# Patient Record
Sex: FEMALE | Race: WHITE | NOT HISPANIC OR LATINO | Employment: UNEMPLOYED | URBAN - METROPOLITAN AREA
[De-identification: names, ages, dates, MRNs, and addresses within clinical notes are randomized per-mention and may not be internally consistent; named-entity substitution may affect disease eponyms.]

---

## 2017-05-15 ENCOUNTER — HOSPITAL ENCOUNTER (EMERGENCY)
Facility: CLINIC | Age: 32
Discharge: HOME | End: 2017-05-15
Attending: PHYSICIAN ASSISTANT | Admitting: PHYSICIAN ASSISTANT
Payer: COMMERCIAL

## 2017-05-15 VITALS
DIASTOLIC BLOOD PRESSURE: 78 MMHG | HEART RATE: 70 BPM | BODY MASS INDEX: 27.6 KG/M2 | HEIGHT: 62 IN | SYSTOLIC BLOOD PRESSURE: 131 MMHG | TEMPERATURE: 98.2 F | RESPIRATION RATE: 18 BRPM | WEIGHT: 150 LBS | OXYGEN SATURATION: 97 %

## 2017-05-15 DIAGNOSIS — S91.312A FOOT LACERATION, LEFT, INITIAL ENCOUNTER: ICD-10-CM

## 2017-05-15 PROCEDURE — 99213 OFFICE O/P EST LOW 20 MIN: CPT | Mod: 25 | Performed by: PHYSICIAN ASSISTANT

## 2017-05-15 PROCEDURE — 99213 OFFICE O/P EST LOW 20 MIN: CPT

## 2017-05-15 PROCEDURE — 12001 RPR S/N/AX/GEN/TRNK 2.5CM/<: CPT | Performed by: PHYSICIAN ASSISTANT

## 2017-05-15 PROCEDURE — 12001 RPR S/N/AX/GEN/TRNK 2.5CM/<: CPT

## 2017-05-15 NOTE — ED AVS SNAPSHOT
[facility] Emergency Department    [address] [address]    Phone:  [phone]    Fax:  [phone]                                       Melina Caicedo   MRN: [id number]    Department:  [facility] Emergency Department   Date of Visit:  5/15/2017           After Visit Summary Signature Page     I have received my discharge instructions, and my questions have been answered. I have discussed any challenges I see with this plan with the nurse or doctor.    ..........................................................................................................................................  Patient/Patient Representative Signature      ..........................................................................................................................................  Patient Representative Print Name and Relationship to Patient    ..................................................               ................................................  Date                                            Time    ..........................................................................................................................................  Reviewed by Signature/Title    ...................................................              ..............................................  Date                                                            Time

## 2017-05-15 NOTE — LETTER
[facility] EMERGENCY DEPARTMENT  [address] [address]  [phone]  Dept: [phone]      5/15/2017    Re: Melina BARRAZA Marifer      TO WHOM IT MAY CONCERN:    Melina Caicedo  was seen on 5/15/17.  Please excuse her from work until 5/17/17 due to injury.    Cordially    Liberty Marlow PA-C   [facility] EMERGENCY DEPARTMENT

## 2017-05-15 NOTE — ED AVS SNAPSHOT
[facility] Emergency Department    [address] [address]    Phone:  [phone]    Fax:  [phone]                                       Melina Caicedo   MRN: [id number]    Department:  [facility] Emergency Department   Date of Visit:  5/15/2017           Patient Information     Date Of Birth          1985        Your diagnoses for this visit were:     Foot laceration, left, initial encounter        You were seen by Liberty Marlow PA-C.      Follow-up Information     Follow up In 12 days.    Why:  for suture removal        Discharge Instructions       After care instructions:  Keep wound clean and dry for the next 24-48 hours  Sutures out in 10-12 days  Signs of infection discussed today  May return to work as long as wound is kept clean and dry  Discussed the probability of scarring  Active range of motion exercises encouraged    Tetanus up to date (2013)    Return to clinic sooner or go to Emergency Room if symptoms worsen or change or signs of infection occur (redness, red streaking, warmth, increased pain, increased swelling, purulent drainage, or fevers occur.)    May use acetaminophen, ibuprofen prn.    Patient voiced understanding of instructions given.            24 Hour Appointment Hotline       To make an appointment at any [facility], call [phone] ([phone]). If you don't have a family doctor or clinic, we will help you find one. [facility] clinics are conveniently located to serve the needs of you and your family.             Review of your medicines      Our records show that you are taking the medicines listed below. If these are incorrect, please call your family doctor or clinic.        Dose / Directions Last dose taken    fluticasone-salmeterol 250-50 MCG/DOSE diskus inhaler   Commonly known as:  ADVAIR   Dose:  1 puff        Inhale 1 puff into the lungs every 12 hours.   Refills:  0        hydrOXYzine 25 MG tablet   Commonly known  as:  ATARAX   Dose:  25-50 mg   Quantity:  20 tablet        Take 1-2 tablets (25-50 mg) by mouth every 6 hours as needed for itching   Refills:  0        ibuprofen 800 MG tablet   Commonly known as:  ADVIL/MOTRIN        TAKE 1 TABLET BY MOUTH THREE TIMES DAILY AS NEEDED FOR PAIN FOR UP TO 10 DAYS   Refills:  0        NICOTINE MINI 2 MG lozenge   Generic drug:  nicotine polacrilex        PLACE ONE LOZENGE BETWEEN CHEEK AND GUM EVERY 1 HOUR AS NEEDED FOR SMOKING CESSATION AS DIRECTED PER PACKAGE   Refills:  0        prenatal multivitamin  with iron 28-0.8 MG Tabs   Dose:  1 tablet        Take 1 tablet by mouth daily.   Refills:  0        senna-docusate 8.6-50 MG per tablet   Commonly known as:  SENOKOT-S;PERICOLACE        Refills:  0        ZYRTEC ALLERGY 10 MG tablet   Dose:  10 mg   Generic drug:  cetirizine        Take 10 mg by mouth At Bedtime.   Refills:  0                Orders Needing Specimen Collection     None      Pending Results     No orders found from 5/13/2017 to 5/16/2017.            Pending Culture Results     No orders found from 5/13/2017 to 5/16/2017.            Pending Results Instructions     If you had any lab results that were not finalized at the time of your Discharge, you can call the ED Lab Result RN at [phone]. You will be contacted by this team for any positive Lab results or changes in treatment. The nurses are available 7 days a week from 10A to 6:30P.  You can leave a message 24 hours per day and they will return your call.        Test Results From Your Hospital Stay               Thank you for choosing [facility]       Thank you for choosing [facility] for your care. Our goal is always to provide you with excellent care. Hearing back from our patients is one way we can continue to improve our services. Please take a few minutes to complete the written survey that you may receive in the mail after you visit with us. Thank you!        [employer] Information     [employer] lets you send  "messages to your doctor, view your test results, renew your prescriptions, schedule appointments and more. To sign up, go to [email or web address] . Click on \"Log in\" on the left side of the screen, which will take you to the Welcome page. Then click on \"Sign up Now\" on the right side of the page.     You will be asked to enter the access code listed below, as well as some personal information. Please follow the directions to create your username and password.     Your access code is: [email or web address]  Expires: 2017  7:07 PM     Your access code will  in 90 days. If you need help or a new code, please call your [facility] clinic or [phone].        Care EveryWhere ID     This is your Care EveryWhere ID. This could be used by other organizations to access your [facility] medical records  [id number]        After Visit Summary       This is your record. Keep this with you and show to your community pharmacist(s) and doctor(s) at your next visit.                  "

## 2017-05-16 ENCOUNTER — APPOINTMENT (OUTPATIENT)
Dept: GENERAL RADIOLOGY | Facility: CLINIC | Age: 32
End: 2017-05-16
Attending: PHYSICIAN ASSISTANT
Payer: COMMERCIAL

## 2017-05-16 ENCOUNTER — HOSPITAL ENCOUNTER (EMERGENCY)
Facility: CLINIC | Age: 32
Discharge: HOME | End: 2017-05-16
Attending: PHYSICIAN ASSISTANT | Admitting: PHYSICIAN ASSISTANT
Payer: COMMERCIAL

## 2017-05-16 VITALS
DIASTOLIC BLOOD PRESSURE: 78 MMHG | RESPIRATION RATE: 16 BRPM | TEMPERATURE: 98.1 F | OXYGEN SATURATION: 100 % | SYSTOLIC BLOOD PRESSURE: 127 MMHG

## 2017-05-16 DIAGNOSIS — M79.672 LEFT FOOT PAIN: ICD-10-CM

## 2017-05-16 DIAGNOSIS — S91.312D FOOT LACERATION, LEFT, SUBSEQUENT ENCOUNTER: ICD-10-CM

## 2017-05-16 PROCEDURE — 73630 X-RAY EXAM OF FOOT: CPT | Mod: LT

## 2017-05-16 PROCEDURE — 99283 EMERGENCY DEPT VISIT LOW MDM: CPT

## 2017-05-16 PROCEDURE — 99283 EMERGENCY DEPT VISIT LOW MDM: CPT | Performed by: PHYSICIAN ASSISTANT

## 2017-05-16 ASSESSMENT — ENCOUNTER SYMPTOMS
CONSTITUTIONAL NEGATIVE: 1
WOUND: 1
NEUROLOGICAL NEGATIVE: 1

## 2017-05-16 NOTE — ED AVS SNAPSHOT
[facility] Emergency Department    [address] [address]    Phone:  [phone]    Fax:  [phone]                                       Melina Caicedo   MRN: [id number]    Department:  [facility] Emergency Department   Date of Visit:  5/16/2017           Patient Information     Date Of Birth          1985        Your diagnoses for this visit were:     Left foot pain     Foot laceration, left, subsequent encounter        You were seen by Shellie Bass PA-C.      Follow-up Information     Follow up with [facility] in 3 days.    Specialty:  Family Practice    Why:  As needed, For persistent symptoms    Contact information:    [facility] PHY  [address] [address]  [phone]          Follow up with [facility] Emergency Department.    Specialty:  EMERGENCY MEDICINE    Why:  As needed, If symptoms worsen    Contact information:    [address] [address] [phone]    Additional information:    The medical center is located at   [address]. (between [address] and   [address] in [place], [place] miles north   of [place]).      24 Hour Appointment Hotline       To make an appointment at any [place] clinic, call [phone] ([phone]). If you don't have a family doctor or clinic, we will help you find one. [place] clinics are conveniently located to serve the needs of you and your family.          ED Discharge Orders     Post-Op Shoe                    Review of your medicines      Our records show that you are taking the medicines listed below. If these are incorrect, please call your family doctor or clinic.        Dose / Directions Last dose taken    fluticasone-salmeterol 250-50 MCG/DOSE diskus inhaler   Commonly known as:  ADVAIR   Dose:  1 puff        Inhale 1 puff into the lungs every 12 hours.   Refills:  0        hydrOXYzine 25 MG tablet   Commonly known as:  ATARAX    Dose:  25-50 mg   Quantity:  20 tablet        Take 1-2 tablets (25-50 mg) by mouth every 6 hours as needed for itching   Refills:  0        ibuprofen 800 MG tablet   Commonly known as:  ADVIL/MOTRIN        TAKE 1 TABLET BY MOUTH THREE TIMES DAILY AS NEEDED FOR PAIN FOR UP TO 10 DAYS   Refills:  0        NICOTINE MINI 2 MG lozenge   Generic drug:  nicotine polacrilex        PLACE ONE LOZENGE BETWEEN CHEEK AND GUM EVERY 1 HOUR AS NEEDED FOR SMOKING CESSATION AS DIRECTED PER PACKAGE   Refills:  0        prenatal multivitamin  with iron 28-0.8 MG Tabs   Dose:  1 tablet        Take 1 tablet by mouth daily.   Refills:  0        senna-docusate 8.6-50 MG per tablet   Commonly known as:  SENOKOT-S;PERICOLACE        Refills:  0        ZYRTEC ALLERGY 10 MG tablet   Dose:  10 mg   Generic drug:  cetirizine        Take 10 mg by mouth At Bedtime.   Refills:  0                Procedures and tests performed during your visit     Foot XR, G/E 3 views, left      Orders Needing Specimen Collection     None      Pending Results     No orders found from 5/14/2017 to 5/17/2017.            Pending Culture Results     No orders found from 5/14/2017 to 5/17/2017.            Pending Results Instructions     If you had any lab results that were not finalized at the time of your Discharge, you can call the ED Lab Result RN at [phone]. You will be contacted by this team for any positive Lab results or changes in treatment. The nurses are available 7 days a week from 10A to 6:30P.  You can leave a message 24 hours per day and they will return your call.        Test Results From Your Hospital Stay        5/16/2017 11:33 AM      Narrative     XR FOOT LT G/E 3 VW 5/16/2017 11:01 AM    HISTORY: Cut by glass yesterday near top of first metatarsal.  Increasing pain.    COMPARISON: None.        Impression     IMPRESSION: 3 views of the left foot show no osseous abnormality. No  radiopaque foreign body is evident.     PEEWEE LOPEZ MD               "  Thank you for choosing [facility]       Thank you for choosing [facility] for your care. Our goal is always to provide you with excellent care. Hearing back from our patients is one way we can continue to improve our services. Please take a few minutes to complete the written survey that you may receive in the mail after you visit with us. Thank you!        [employer] Information     [employer] lets you send messages to your doctor, view your test results, renew your prescriptions, schedule appointments and more. To sign up, go to [email or web address] . Click on \"Log in\" on the left side of the screen, which will take you to the Welcome page. Then click on \"Sign up Now\" on the right side of the page.     You will be asked to enter the access code listed below, as well as some personal information. Please follow the directions to create your username and password.     Your access code is: [email or web address]  Expires: 2017  7:07 PM     Your access code will  in 90 days. If you need help or a new code, please call your [facility] clinic or [phone].        Care EveryWhere ID     This is your Care EveryWhere ID. This could be used by other organizations to access your [facility] medical records  [id number]        After Visit Summary       This is your record. Keep this with you and show to your community pharmacist(s) and doctor(s) at your next visit.                  "

## 2017-05-16 NOTE — ED PROVIDER NOTES
"  History     Chief Complaint   Patient presents with     Laceration     L foot.  pt dropped a beer bottle on L foot.  UTD tetanus     HPI    Melina Caicedo is a 31 year old female who presents to the clinic with a laceration on the foot sustained 1 hours ago.  This is a non-work related injury.  Mechanism of injury: patient accidentally dropped a glass bottle and when it hit the deck it shattered and a piece of glass cut the top of her foot.     Associated symptoms: Denies numbness, weakness, or loss of function  Last tetanus booster within 5 years: yes    Problem list, Medication list, Allergies, and Medical/Social/Surgical histories reviewed in [facility] and updated as appropriate.    Review of Systems     All normal unless stated above     Physical Exam   BP: 131/78  Pulse: 70  Temp: 98.2  F (36.8  C)  Resp: 18  Height: 157.5 cm (5' 2\")  Weight: 68 kg (150 lb)  SpO2: 97 %  Physical Exam     Blood pressure 131/78, pulse 70, temperature 98.2  F (36.8  C), temperature source Oral, resp. rate 18, height 1.575 m (5' 2\"), weight 68 kg (150 lb), SpO2 97 %, not currently breastfeeding.  The patient appears today in alert and in no apparent distress distress  Size of laceration: 1 centimeters  Characteristics of the laceration: bleeding- mild, clean, straight and superficial  Tendon function intact: yes  Sensation to light touch intact: yes  Pulses intact: yes    No results found for this or any previous visit (from the past 24 hour(s)).        ED Course     ED Course     Laceration repair  Date/Time: 5/15/2017 7:11 PM  Performed by: RACHNA EASON  Authorized by: RACHNA EASON   Consent: Verbal consent obtained.  Risks and benefits: risks, benefits and alternatives were discussed  Consent given by: patient  Patient identity confirmed: verbally with patient  Body area: lower extremity  Location details: left foot  Laceration length: 1 cm  Foreign bodies: no foreign bodies  Tendon involvement: none  Nerve " involvement: none  Vascular damage: no  Anesthesia: local infiltration    Anesthesia:  Anesthesia: local infiltration  Local Anesthetic: lidocaine 1% without epinephrine   Preparation: Patient was prepped and draped in the usual sterile fashion.  Irrigation solution: saline  Irrigation method: syringe  Amount of cleaning: standard  Debridement: none  Degree of undermining: none  Skin closure: 4-0 nylon  Number of sutures: 3  Technique: simple  Approximation: close  Approximation difficulty: simple  Dressing: antibiotic ointment (bandage)  Patient tolerance: Patient tolerated the procedure well with no immediate complications                  Critical Care time:  none               Labs Ordered and Resulted from Time of ED Arrival Up to the Time of Departure from the ED - No data to display    Assessments & Plan (with Medical Decision Making)     I have reviewed the nursing notes.    I have reviewed the findings, diagnosis, plan and need for follow up with the patient.    Discharge Medication List as of 5/15/2017  7:07 PM          Final diagnoses:   Foot laceration, left, initial encounter       5/15/2017   [facility] EMERGENCY DEPARTMENT     Liberty Marlow PA-C  05/15/17 1913       Liberty Marlow PA-C  05/15/17 1917

## 2017-05-16 NOTE — ED NOTES
Pt dropped a heavy jar on L foot last evening.  Here for stitches.  Wondering if she broke something in her foot.

## 2017-05-16 NOTE — ED NOTES
Patient had dropped heavy jar to right of left foot yesterday with bottom striking floor and then top of left foot.  Came to urgent care for stitches.  Today difficult to put pressure on left foot.  Swelling and bruising noted to top of foot  CMS good.  Sutures intact.  No redness noted.

## 2017-05-16 NOTE — ED AVS SNAPSHOT
[facility] Emergency Department    [address] [address]    Phone:  [phone]    Fax:  [phone]                                       Melina Caicedo   MRN: [id number]    Department:  [facility] Emergency Department   Date of Visit:  5/16/2017           After Visit Summary Signature Page     I have received my discharge instructions, and my questions have been answered. I have discussed any challenges I see with this plan with the nurse or doctor.    ..........................................................................................................................................  Patient/Patient Representative Signature      ..........................................................................................................................................  Patient Representative Print Name and Relationship to Patient    ..................................................               ................................................  Date                                            Time    ..........................................................................................................................................  Reviewed by Signature/Title    ...................................................              ..............................................  Date                                                            Time

## 2017-05-16 NOTE — LETTER
[facility] EMERGENCY DEPARTMENT  [address] [address]  [phone]    May 16, 2017    Melina Caicedo  [address] [address]  [phone] (home)     : 1985      To Whom it may concern:    Melina Caicedo was seen in our Emergency Department today, May 16, 2017.  Please excuse from work on 17 and 17.  Thank you.      Sincerely,        Shellie Bass

## 2017-05-16 NOTE — ED PROVIDER NOTES
History     Chief Complaint   Patient presents with     Foot Injury     Pt dropped a heavy jar on L foot last evening.  Here for stitches.  Wondering if she broke something in her foot.     HPI  Melina Caicedo is a 31 year old female with history of asthma, bipolar affective disorder, anxiety who presents with complaints of left foot pain since yesterday.  Patient was evaluated in the urgent care yesterday after she was accidentally cut by a beer bottle.  She states she dropped a glass bottle and it shattered when it hit the ground.  She states a piece of glass reportedly bounced back up off the ground and cut the top of her foot.  Three sutures were placed when she was evaluated in urgent care yesterday.  No x-rays were obtained at that time.  Patient's laceration was reportedly superficial, and she did not have evidence of tendon laceration or injury on yesterday's exam.  She states she has had worsening foot pain since last night.  Describes pain overlying the dorsum of her foot surrounding the laceration site.  States she is unable to wear her tennis shoe as it rubs over this area causing more pain.  She has been unable to put much weight on her foot this morning.  Denies any associated swelling or erythema of the area.  No reported fevers or chills.  She denies any new injury to the area.  She states she took 800 mg of Ibuprofen this morning with minimal improvement.  Her Tetanus is up-to-date (2014).    I have reviewed the Medications, Allergies, Past Medical and Surgical History, and Social History in the Epic system.    Review of Systems   Constitutional: Negative.    Musculoskeletal:        Left foot pain   Skin: Positive for wound (sutures intact to foot laceration).   Neurological: Negative.    All other systems reviewed and are negative.      Physical Exam   BP: 127/78  Heart Rate: 68  Temp: 98.1  F (36.7  C)  Resp: 16  SpO2: 100 %  Physical Exam   Constitutional: She appears well-developed and  well-nourished. No distress.   HENT:   Head: Normocephalic and atraumatic.   Cardiovascular: Intact distal pulses.    Musculoskeletal:        Left ankle: Normal.        Left foot: There is decreased range of motion and tenderness. There is no swelling, normal capillary refill, no crepitus, no deformity and no laceration.        Feet:    There are 3 sutures intact to laceration to dorsum of left foot overlying the 1st distal metatarsal.  There is no surrounding erythema, warmth, or swelling.  Patient has point tenderness to this area.  She notes increased pain with range of motion of her great toe.  No streaking erythema.   Neurological: She is alert. She has normal strength. No sensory deficit.   Skin: Skin is warm and dry.       ED Course     ED Course     Procedures    Results for orders placed or performed during the hospital encounter of 05/16/17   Foot XR, G/E 3 views, left    Narrative    XR FOOT LT G/E 3 VW 5/16/2017 11:01 AM    HISTORY: Cut by glass yesterday near top of first metatarsal.  Increasing pain.    COMPARISON: None.      Impression    IMPRESSION: 3 views of the left foot show no osseous abnormality. No  radiopaque foreign body is evident.     PEEWEE LOPEZ MD       Assessments & Plan (with Medical Decision Making)     Pt is a 31 year old female with history of asthma, bipolar affective disorder, anxiety who presents with complaints of left foot pain since yesterday.  Patient was evaluated in the urgent care yesterday after she was accidentally cut by a beer bottle.  She states she dropped a glass bottle and it shattered when it hit the ground.  She states a piece of glass reportedly bounced back up off the ground and cut the top of her foot.  Three sutures were placed when she was evaluated in urgent care yesterday.  No x-rays were obtained at that time.  Patient's laceration was reportedly superficial, and she did not have evidence of tendon laceration or injury on yesterday's exam.  She states  she has had worsening foot pain since last night.  Describes pain overlying the dorsum of her foot surrounding the laceration site.  States she is unable to wear her tennis shoe as it rubs over this area causing more pain.  She has been unable to put much weight on her foot this morning.  Denies any associated swelling or erythema of the area.  No reported fevers or chills.  She denies any new injury to the area.  She states she took 800 mg of Ibuprofen this morning with minimal improvement.  Her Tetanus is up-to-date (2014).  Pt is afebrile on arrival.  Exam as above.  No evidence of infection noted on exam.  X-rays of left foot were negative for acute pathology or foreign body.  We discussed signs and symptoms of infection to be aware of and return for.  Pt was placed in a post-op shoe to assist with her symptoms.  Hand-outs provided.    Patient was instructed to follow-up with PCP if no improvement in 3-5 days for continued care and management or sooner if new or worsening symptoms.  She is to return to the ED for persistent and/or worsening symptoms.  Patient expressed understanding of the diagnosis and plan and was discharged home in good condition.     have reviewed the nursing notes.    I have reviewed the findings, diagnosis, plan and need for follow up with the patient.    Discharge Medication List as of 5/16/2017 11:42 AM          Final diagnoses:   Left foot pain   Foot laceration, left, subsequent encounter       5/16/2017   [facility] EMERGENCY DEPARTMENT     Shellie Bass PA-C  05/16/17 9790

## 2017-05-16 NOTE — DISCHARGE INSTRUCTIONS
After care instructions:  Keep wound clean and dry for the next 24-48 hours  Sutures out in 10-12 days  Signs of infection discussed today  May return to work as long as wound is kept clean and dry  Discussed the probability of scarring  Active range of motion exercises encouraged    Tetanus up to date (2013)    Return to clinic sooner or go to Emergency Room if symptoms worsen or change or signs of infection occur (redness, red streaking, warmth, increased pain, increased swelling, purulent drainage, or fevers occur.)    May use acetaminophen, ibuprofen prn.    Patient voiced understanding of instructions given.

## 2017-11-02 ENCOUNTER — HOSPITAL ENCOUNTER (EMERGENCY)
Facility: CLINIC | Age: 32
Discharge: HOME | End: 2017-11-02
Attending: EMERGENCY MEDICINE | Admitting: EMERGENCY MEDICINE
Payer: COMMERCIAL

## 2017-11-02 VITALS
TEMPERATURE: 98.1 F | WEIGHT: 130 LBS | DIASTOLIC BLOOD PRESSURE: 86 MMHG | RESPIRATION RATE: 16 BRPM | SYSTOLIC BLOOD PRESSURE: 128 MMHG | HEART RATE: 75 BPM | BODY MASS INDEX: 23.78 KG/M2 | OXYGEN SATURATION: 98 %

## 2017-11-02 DIAGNOSIS — J06.9 VIRAL URI: ICD-10-CM

## 2017-11-02 LAB
DEPRECATED S PYO AG THROAT QL EIA: NORMAL
SPECIMEN SOURCE: NORMAL

## 2017-11-02 PROCEDURE — 87880 STREP A ASSAY W/OPTIC: CPT | Performed by: EMERGENCY MEDICINE

## 2017-11-02 PROCEDURE — 25000132 ZZH RX MED GY IP 250 OP 250 PS 637: Performed by: EMERGENCY MEDICINE

## 2017-11-02 PROCEDURE — 87081 CULTURE SCREEN ONLY: CPT | Performed by: EMERGENCY MEDICINE

## 2017-11-02 PROCEDURE — 99283 EMERGENCY DEPT VISIT LOW MDM: CPT | Mod: Z6 | Performed by: EMERGENCY MEDICINE

## 2017-11-02 PROCEDURE — 99283 EMERGENCY DEPT VISIT LOW MDM: CPT | Performed by: EMERGENCY MEDICINE

## 2017-11-02 RX ORDER — MULTIVITAMIN WITH IRON
500 TABLET ORAL AT BEDTIME
COMMUNITY

## 2017-11-02 RX ORDER — VENLAFAXINE HYDROCHLORIDE 37.5 MG/1
37.5 CAPSULE, EXTENDED RELEASE ORAL DAILY
COMMUNITY

## 2017-11-02 RX ORDER — IBUPROFEN 400 MG/1
800 TABLET, FILM COATED ORAL ONCE
Status: COMPLETED | OUTPATIENT
Start: 2017-11-02 | End: 2017-11-02

## 2017-11-02 RX ADMIN — IBUPROFEN 800 MG: 400 TABLET ORAL at 20:32

## 2017-11-02 NOTE — ED AVS SNAPSHOT
[facility] Emergency Department    [address] [address]    Phone:  [phone]    Fax:  [phone]                                       Melina Caicedo   MRN: [id number]    Department:  [facility] Emergency Department   Date of Visit:  11/2/2017           Patient Information     Date Of Birth          1985        Your diagnoses for this visit were:     Viral URI        You were seen by Jonh Inman MD.        Discharge Instructions       Return to the emergency department if you have worsening symptoms, repeated vomiting, no urine output over 8 hours, difficulty breathing, or other concerns.  Otherwise follow-up with clinic if not better in 2-3 days.    24 Hour Appointment Hotline       To make an appointment at any [place] clinic, call [phone] ([phone]). If you don't have a family doctor or clinic, we will help you find one. [place] clinics are conveniently located to serve the needs of you and your family.             Review of your medicines      Our records show that you are taking the medicines listed below. If these are incorrect, please call your family doctor or clinic.        Dose / Directions Last dose taken    fluticasone-salmeterol 250-50 MCG/DOSE diskus inhaler   Commonly known as:  ADVAIR   Dose:  1 puff        Inhale 1 puff into the lungs every 12 hours.   Refills:  0        ibuprofen 800 MG tablet   Commonly known as:  ADVIL/MOTRIN        TAKE 1 TABLET BY MOUTH THREE TIMES DAILY AS NEEDED FOR PAIN FOR UP TO 10 DAYS   Refills:  0        isometheptene-dichloralphenazone-acetaminophen -325 MG per capsule   Commonly known as:  MIDRIN   Dose:  1 capsule        Take 1 capsule by mouth every 4 hours as needed for headaches   Refills:  0        magnesium 250 MG tablet   Dose:  1 tablet        Take 1 tablet by mouth daily   Refills:  0        prenatal multivitamin  with iron 28-0.8 MG Tabs   Dose:  1 tablet        Take 1 tablet by  mouth daily.   Refills:  0        senna-docusate 8.6-50 MG per tablet   Commonly known as:  SENOKOT-S;PERICOLACE   Dose:  2 tablet        Take 2 tablets by mouth daily   Refills:  0        venlafaxine 37.5 MG 24 hr capsule   Commonly known as:  EFFEXOR-XR   Dose:  37.5 mg        Take 37.5 mg by mouth daily   Refills:  0        ZYRTEC ALLERGY 10 MG tablet   Dose:  10 mg   Generic drug:  cetirizine        Take 10 mg by mouth At Bedtime.   Refills:  0                Procedures and tests performed during your visit     Rapid Strep Screen      Orders Needing Specimen Collection     None      Pending Results     No orders found from 10/31/2017 to 11/3/2017.            Pending Culture Results     No orders found from 10/31/2017 to 11/3/2017.            Pending Results Instructions     If you had any lab results that were not finalized at the time of your Discharge, you can call the ED Lab Result RN at [phone]. You will be contacted by this team for any positive Lab results or changes in treatment. The nurses are available 7 days a week from 10A to 6:30P.  You can leave a message 24 hours per day and they will return your call.        Test Results From Your Hospital Stay        11/2/2017  8:58 PM      Component Results     Component    Specimen Description    Throat    Rapid Strep A Screen    NEGATIVE: No Group A streptococcal antigen detected by immunoassay, await culture report.                Thank you for choosing [facility]       Thank you for choosing [facility] for your care. Our goal is always to provide you with excellent care. Hearing back from our patients is one way we can continue to improve our services. Please take a few minutes to complete the written survey that you may receive in the mail after you visit with us. Thank you!        [employer] Information     [employer] lets you send messages to your doctor, view your test results, renew your prescriptions, schedule appointments and more. To sign up, go to  [email or web address] . Click on \"Log in\" on the left side of the screen, which will take you to the Welcome page. Then click on \"Sign up Now\" on the right side of the page.     You will be asked to enter the access code listed below, as well as some personal information. Please follow the directions to create your username and password.     Your access code is: [email or web address]  Expires: 2018  9:00 PM     Your access code will  in 90 days. If you need help or a new code, please call your [place] clinic or [phone].        Care EveryWhere ID     This is your Care EveryWhere ID. This could be used by other organizations to access your [place] medical records  [id number]        Equal Access to Services     ZAINAB BURNETTE : Vance Patel, rolando hutchins, yaya riley, marichuy varela. So [facility] [phone].    ATENCIÓN: Si habla español, tiene a lawrence disposición servicios gratuitos de asistencia lingüística. Davis al [phone].    We comply with applicable federal civil rights laws and [place] laws. We do not discriminate on the basis of race, color, national origin, age, disability, sex, sexual orientation, or gender identity.            After Visit Summary       This is your record. Keep this with you and show to your community pharmacist(s) and doctor(s) at your next visit.                  "

## 2017-11-02 NOTE — ED AVS SNAPSHOT
[facility] Emergency Department    [address] [address]    Phone:  [phone]    Fax:  [phone]                                       Melina Caicedo   MRN: [id number]    Department:  [facility] Emergency Department   Date of Visit:  11/2/2017           After Visit Summary Signature Page     I have received my discharge instructions, and my questions have been answered. I have discussed any challenges I see with this plan with the nurse or doctor.    ..........................................................................................................................................  Patient/Patient Representative Signature      ..........................................................................................................................................  Patient Representative Print Name and Relationship to Patient    ..................................................               ................................................  Date                                            Time    ..........................................................................................................................................  Reviewed by Signature/Title    ...................................................              ..............................................  Date                                                            Time

## 2017-11-03 NOTE — DISCHARGE INSTRUCTIONS
Return to the emergency department if you have worsening symptoms, repeated vomiting, no urine output over 8 hours, difficulty breathing, or other concerns.  Otherwise follow-up with clinic if not better in 2-3 days.

## 2017-11-03 NOTE — ED NOTES
Pt presents with c/o right ear pain and vertigo for the past 3 days. Has hx tonsil stones, feels this may be the reason for ear pain. Pt also dx with meningioma in July and feels her vertigo may be just due to that also. No fevers, nasal congestion. C/o some nausea.

## 2017-11-03 NOTE — ED PROVIDER NOTES
History     Chief Complaint   Patient presents with     Otalgia     swollen glands and vertigo     HPI  Melina Caicedo is a 32 year old female who presents for right ear pain, sore throat, hoarse voice, and mild frontal headache.  Symptoms have been ongoing for the past several days including worse.  Eating and drinking normally.  She has had 2 episodes of nonbloody and nonbilious emesis today but denies any nausea currently.  No diarrhea.  She denies chest pain, cough, abdominal pain, dysuria, or rash.  She does have a history of a meningioma that is being followed and reports that she has been having intermittent vertigo over the past several months, slightly worse today.  She describes episodes last for 30 seconds to a minute and then resolved and happened about 6 times today. No vertigo currently.  She denies double vision, recent falls, word finding difficulty.    Problem List:    Patient Active Problem List    Diagnosis Date Noted     Major depressive disorder 10/21/2016     Priority: Medium     Overview:   Major depression especially post partum.       Hematuria 10/10/2013     Priority: Medium     Heart murmur 09/12/2013     Priority: Medium     Overview:   Auscultated on 9/12/13.  No history of murmur according to patient and chart.       Persistent asthma 11/09/2012     Priority: Medium     Anxiety 02/01/2007     Priority: Medium        Past Medical History:    Past Medical History:   Diagnosis Date     Anxiety      Asthma      Bipolar affective (H)      Depressive disorder        Past Surgical History:    Past Surgical History:   Procedure Laterality Date     CHOLECYSTECTOMY  6/2011     ENDOSCOPY UPPER WITH PANCREATIC STIMULATION         Family History:    No family history on file.    Social History:  Marital Status:  Single [1]  Social History   Substance Use Topics     Smoking status: Never Smoker     Smokeless tobacco: Never Used     Alcohol use No        Medications:       isometheptene-dichloralphenazone-acetaminophen (MIDRIN) -325 MG per capsule   venlafaxine (EFFEXOR-XR) 37.5 MG 24 hr capsule   magnesium 250 MG tablet   ibuprofen (ADVIL,MOTRIN) 800 MG tablet   senna-docusate (SENOKOT-S;PERICOLACE) 8.6-50 MG per tablet   cetirizine (ZYRTEC ALLERGY) 10 MG tablet   Prenatal Vit-Fe Fumarate-FA (PRENATAL MULTIVITAMIN  WITH IRON) 28-0.8 MG TABS   fluticasone-salmeterol (ADVAIR) 250-50 MCG/DOSE diskus inhaler         Review of Systems  A 4 point review of systems was performed. All pertinent positives and negatives were listed in the HPI and rest of ROS were otherwise negative.    Physical Exam   BP: 128/86  Pulse: 75  Temp: 98.1  F (36.7  C)  Resp: 16  Weight: 59 kg (130 lb)  SpO2: 98 %      Physical Exam   Constitutional: She is oriented to person, place, and time. She appears well-developed and well-nourished. She appears distressed.   HENT:   Head: Normocephalic and atraumatic.   Right Ear: Tympanic membrane and external ear normal. No drainage, swelling or tenderness.   Left Ear: Tympanic membrane and external ear normal.   Nose: Nose normal.   Mouth/Throat: No trismus in the jaw. No oropharyngeal exudate, posterior oropharyngeal edema, posterior oropharyngeal erythema or tonsillar abscesses.   Eyes: Conjunctivae are normal. No scleral icterus.   Neck: Normal range of motion.   Cardiovascular: Normal rate and regular rhythm.    Pulmonary/Chest: Effort normal. No stridor. No respiratory distress.   Abdominal: Soft. She exhibits no distension. There is no tenderness. There is no rigidity, no rebound and no guarding.   Neurological: She is alert and oriented to person, place, and time. No cranial nerve deficit or sensory deficit. She exhibits normal muscle tone. Coordination and gait normal. GCS eye subscore is 4. GCS verbal subscore is 5. GCS motor subscore is 6.   No dysmetria.  No dysdiadochokinesis.  Normal tandem gait.  No visual field deficit.   Skin: Skin is warm and  dry. She is not diaphoretic.   Psychiatric: She has a normal mood and affect. Her behavior is normal.   Nursing note and vitals reviewed.      ED Course     ED Course     Procedures               Critical Care time:  none               Labs Ordered and Resulted from Time of ED Arrival Up to the Time of Departure from the ED   RAPID STREP SCREEN       Assessments & Plan (with Medical Decision Making)   32-year-old female presents for right ear pain and sore throat, hoarse voice.  Differential includes viral URI, pharyngitis, otitis media.  Blood pressure 120/86, heart 75, temperature 98.1 Fahrenheit, SPO2 98% on room air.  No focal neurologic findings today, no signs of worsening of her meningioma, no indication for repeat imaging at this time.  No signs of otitis media or otitis externa on exam. Ibuprofen for symptoms.  Throat swab negative for group A strep.  Throat culture pending.  She is likely suffering from a viral URI and is discharged with instructions to use acetaminophen and ibuprofen.  Symptoms, drinking any fluids, return if worse, otherwise follow up in clinic in 2-3 days if not improved.  The patient is in agreement with this plan.    I have reviewed the nursing notes.    I have reviewed the findings, diagnosis, plan and need for follow up with the patient.       New Prescriptions    No medications on file       Final diagnoses:   Viral URI       11/2/2017   [facility] EMERGENCY DEPARTMENT     Jonh Inman MD  11/02/17 2819

## 2017-11-05 LAB
BACTERIA SPEC CULT: NORMAL
Lab: NORMAL
SPECIMEN SOURCE: NORMAL

## 2018-02-20 ENCOUNTER — APPOINTMENT (OUTPATIENT)
Dept: ULTRASOUND IMAGING | Facility: CLINIC | Age: 33
End: 2018-02-20
Attending: FAMILY MEDICINE
Payer: COMMERCIAL

## 2018-02-20 ENCOUNTER — HOSPITAL ENCOUNTER (EMERGENCY)
Facility: CLINIC | Age: 33
Discharge: HOME | End: 2018-02-20
Attending: FAMILY MEDICINE | Admitting: FAMILY MEDICINE
Payer: COMMERCIAL

## 2018-02-20 VITALS — TEMPERATURE: 98.6 F | RESPIRATION RATE: 18 BRPM | SYSTOLIC BLOOD PRESSURE: 128 MMHG | DIASTOLIC BLOOD PRESSURE: 75 MMHG

## 2018-02-20 DIAGNOSIS — R10.31 RLQ ABDOMINAL PAIN: ICD-10-CM

## 2018-02-20 LAB
ALBUMIN SERPL-MCNC: 4.2 G/DL (ref 3.4–5)
ALBUMIN UR-MCNC: NEGATIVE MG/DL
ALP SERPL-CCNC: 58 U/L (ref 40–150)
ALT SERPL W P-5'-P-CCNC: 27 U/L (ref 0–50)
ANION GAP SERPL CALCULATED.3IONS-SCNC: 7 MMOL/L (ref 3–14)
APPEARANCE UR: CLEAR
AST SERPL W P-5'-P-CCNC: 30 U/L (ref 0–45)
BASOPHILS # BLD AUTO: 0 10E9/L (ref 0–0.2)
BASOPHILS NFR BLD AUTO: 0.5 %
BILIRUB SERPL-MCNC: 0.8 MG/DL (ref 0.2–1.3)
BILIRUB UR QL STRIP: NEGATIVE
BUN SERPL-MCNC: 12 MG/DL (ref 7–30)
CALCIUM SERPL-MCNC: 8.9 MG/DL (ref 8.5–10.1)
CHLORIDE SERPL-SCNC: 106 MMOL/L (ref 94–109)
CO2 SERPL-SCNC: 27 MMOL/L (ref 20–32)
COLOR UR AUTO: COLORLESS
CREAT SERPL-MCNC: 0.59 MG/DL (ref 0.52–1.04)
DIFFERENTIAL METHOD BLD: NORMAL
EOSINOPHIL # BLD AUTO: 0.1 10E9/L (ref 0–0.7)
EOSINOPHIL NFR BLD AUTO: 1.6 %
ERYTHROCYTE [DISTWIDTH] IN BLOOD BY AUTOMATED COUNT: 12.4 % (ref 10–15)
GFR SERPL CREATININE-BSD FRML MDRD: >90 ML/MIN/1.7M2
GLUCOSE SERPL-MCNC: 77 MG/DL (ref 70–99)
GLUCOSE UR STRIP-MCNC: NEGATIVE MG/DL
HCG SERPL QL: NEGATIVE
HCT VFR BLD AUTO: 37.5 % (ref 35–47)
HGB BLD-MCNC: 13 G/DL (ref 11.7–15.7)
HGB UR QL STRIP: ABNORMAL
IMM GRANULOCYTES # BLD: 0 10E9/L (ref 0–0.4)
IMM GRANULOCYTES NFR BLD: 0.2 %
KETONES UR STRIP-MCNC: 5 MG/DL
LEUKOCYTE ESTERASE UR QL STRIP: NEGATIVE
LIPASE SERPL-CCNC: 107 U/L (ref 73–393)
LYMPHOCYTES # BLD AUTO: 2.3 10E9/L (ref 0.8–5.3)
LYMPHOCYTES NFR BLD AUTO: 38.1 %
MCH RBC QN AUTO: 29.5 PG (ref 26.5–33)
MCHC RBC AUTO-ENTMCNC: 34.7 G/DL (ref 31.5–36.5)
MCV RBC AUTO: 85 FL (ref 78–100)
MONOCYTES # BLD AUTO: 0.4 10E9/L (ref 0–1.3)
MONOCYTES NFR BLD AUTO: 5.9 %
NEUTROPHILS # BLD AUTO: 3.3 10E9/L (ref 1.6–8.3)
NEUTROPHILS NFR BLD AUTO: 53.7 %
NITRATE UR QL: NEGATIVE
PH UR STRIP: 7 PH (ref 5–7)
PLATELET # BLD AUTO: 367 10E9/L (ref 150–450)
POTASSIUM SERPL-SCNC: 3.5 MMOL/L (ref 3.4–5.3)
PROT SERPL-MCNC: 8 G/DL (ref 6.8–8.8)
RBC # BLD AUTO: 4.4 10E12/L (ref 3.8–5.2)
RBC #/AREA URNS AUTO: 0 /HPF (ref 0–2)
SODIUM SERPL-SCNC: 140 MMOL/L (ref 133–144)
SOURCE: ABNORMAL
SP GR UR STRIP: 1 (ref 1–1.03)
SQUAMOUS #/AREA URNS AUTO: <1 /HPF (ref 0–1)
UROBILINOGEN UR STRIP-MCNC: 0 MG/DL (ref 0–2)
WBC # BLD AUTO: 6.1 10E9/L (ref 4–11)
WBC #/AREA URNS AUTO: 0 /HPF (ref 0–2)

## 2018-02-20 PROCEDURE — 85025 COMPLETE CBC W/AUTO DIFF WBC: CPT | Performed by: FAMILY MEDICINE

## 2018-02-20 PROCEDURE — 80053 COMPREHEN METABOLIC PANEL: CPT | Performed by: FAMILY MEDICINE

## 2018-02-20 PROCEDURE — 83690 ASSAY OF LIPASE: CPT | Performed by: FAMILY MEDICINE

## 2018-02-20 PROCEDURE — 99284 EMERGENCY DEPT VISIT MOD MDM: CPT | Mod: 25 | Performed by: FAMILY MEDICINE

## 2018-02-20 PROCEDURE — 84703 CHORIONIC GONADOTROPIN ASSAY: CPT | Performed by: FAMILY MEDICINE

## 2018-02-20 PROCEDURE — 81001 URINALYSIS AUTO W/SCOPE: CPT | Performed by: FAMILY MEDICINE

## 2018-02-20 PROCEDURE — 93976 VASCULAR STUDY: CPT

## 2018-02-20 PROCEDURE — 99284 EMERGENCY DEPT VISIT MOD MDM: CPT | Mod: Z6 | Performed by: FAMILY MEDICINE

## 2018-02-20 RX ORDER — POLYETHYLENE GLYCOL 3350 17 G
2 POWDER IN PACKET (EA) ORAL EVERY 4 HOURS PRN
COMMUNITY
Start: 2017-12-29

## 2018-02-20 RX ORDER — ONDANSETRON 4 MG/1
4-8 TABLET, ORALLY DISINTEGRATING ORAL EVERY 6 HOURS PRN
Qty: 10 TABLET | Refills: 0 | Status: SHIPPED | OUTPATIENT
Start: 2018-02-20 | End: 2019-07-03

## 2018-02-20 NOTE — ED AVS SNAPSHOT
[facility] Emergency Department    [address] [address]    Phone:  [phone]    Fax:  [phone]                                       Melina Caicedo   MRN: [id number]    Department:  [facility] Emergency Department   Date of Visit:  2/20/2018           Patient Information     Date Of Birth          1985        Your diagnoses for this visit were:     RLQ abdominal pain        You were seen by Albino Lynn MD.        Discharge Instructions       Return to the Emergency Room if the following occurs:     Worsened pain, fever >101, vomiting/dehydration, or for any concern at anytime.    Or, follow-up with the following provider as we discussed:     Return to your primary doctor / gynecologist as scheduled.    Medications discussed:    Ibuprofen 600 mg every six hours for pain (7 days duration).  Tylenol 1000 mg every six hours for pain (7 days duration).  Therefore, you can alternate these every three hours and do it safely.  Zofran for nausea, as needed.    If you received pain-relieving or sedating medication during your time in the ER, avoid alcohol, driving automobiles, or working with machinery.  Also, a responsible adult must stay with you.        Call the Nurse Advice Line at [phone] or [phone] for any concern at anytime.      24 Hour Appointment Hotline       To make an appointment at any [place] clinic, call [phone] ([phone]). If you don't have a family doctor or clinic, we will help you find one. [place] clinics are conveniently located to serve the needs of you and your family.             Review of your medicines      START taking        Dose / Directions Last dose taken    ondansetron 4 MG ODT tab   Commonly known as:  ZOFRAN ODT   Dose:  4-8 mg   Quantity:  10 tablet        Take 1-2 tablets (4-8 mg) by mouth every 6 hours as needed for nausea   Refills:  0          Our records show that you are taking the medicines listed  below. If these are incorrect, please call your family doctor or clinic.        Dose / Directions Last dose taken    fluticasone-salmeterol 250-50 MCG/DOSE diskus inhaler   Commonly known as:  ADVAIR   Dose:  1 puff        Inhale 1 puff into the lungs every 12 hours.   Refills:  0        IBUPROFEN PO   Dose:  400 mg        Take 400 mg by mouth every 4 hours as needed for moderate pain   Refills:  0        isometheptene-dichloralphenazone-acetaminophen -325 MG per capsule   Commonly known as:  MIDRIN   Dose:  1 capsule        Take 1 capsule by mouth every 4 hours as needed for headaches   Refills:  0        magnesium 250 MG tablet   Dose:  1 tablet        Take 1 tablet by mouth daily   Refills:  0        nicotine polacrilex 2 MG lozenge   Commonly known as:  COMMIT        every hour as needed   Refills:  0        prenatal multivitamin  with iron 28-0.8 MG Tabs   Dose:  1 tablet        Take 1 tablet by mouth daily.   Refills:  0        senna-docusate 8.6-50 MG per tablet   Commonly known as:  SENOKOT-S;PERICOLACE   Dose:  2 tablet        Take 2 tablets by mouth daily   Refills:  0        venlafaxine 37.5 MG 24 hr capsule   Commonly known as:  EFFEXOR-XR   Dose:  37.5 mg        Take 37.5 mg by mouth daily   Refills:  0        VITAMIN B-2 PO   Dose:  25 mg        Take 25 mg by mouth daily   Refills:  0        ZYRTEC ALLERGY 10 MG tablet   Dose:  10 mg   Generic drug:  cetirizine        Take 10 mg by mouth At Bedtime.   Refills:  0                Prescriptions were sent or printed at these locations (1 Prescription)                   [facility] [address] [address]    Telephone:  [phone]   Fax:  [phone]   Hours:                  Printed at Department/Unit printer (1 of 1)         ondansetron (ZOFRAN ODT) 4 MG ODT tab                Procedures and tests performed during your visit     CBC with platelets, differential    Comprehensive  metabolic panel    HCG qualitative pregnancy (blood)    Lipase    Pelvic Ultrasound (US) with doppler imaging (r/o ovarian torsion)    UA with Microscopic reflex to Culture      Orders Needing Specimen Collection     None      Pending Results     No orders found from 2/18/2018 to 2/21/2018.            Pending Culture Results     No orders found from 2/18/2018 to 2/21/2018.            Pending Results Instructions     If you had any lab results that were not finalized at the time of your Discharge, you can call the ED Lab Result RN at [phone]. You will be contacted by this team for any positive Lab results or changes in treatment. The nurses are available 7 days a week from 10A to 6:30P.  You can leave a message 24 hours per day and they will return your call.        Test Results From Your Hospital Stay        2/20/2018  8:03 PM      Component Results     Component Value Ref Range & Units Status    WBC 6.1 4.0 - 11.0 10e9/L Final    RBC Count 4.40 3.8 - 5.2 10e12/L Final    Hemoglobin 13.0 11.7 - 15.7 g/dL Final    Hematocrit 37.5 35.0 - 47.0 % Final    MCV 85 78 - 100 fl Final    MCH 29.5 26.5 - 33.0 pg Final    MCHC 34.7 31.5 - 36.5 g/dL Final    RDW 12.4 10.0 - 15.0 % Final    Platelet Count 367 150 - 450 10e9/L Final    Diff Method Automated Method  Final    % Neutrophils 53.7 % Final    % Lymphocytes 38.1 % Final    % Monocytes 5.9 % Final    % Eosinophils 1.6 % Final    % Basophils 0.5 % Final    % Immature Granulocytes 0.2 % Final    Absolute Neutrophil 3.3 1.6 - 8.3 10e9/L Final    Absolute Lymphocytes 2.3 0.8 - 5.3 10e9/L Final    Absolute Monocytes 0.4 0.0 - 1.3 10e9/L Final    Absolute Eosinophils 0.1 0.0 - 0.7 10e9/L Final    Absolute Basophils 0.0 0.0 - 0.2 10e9/L Final    Abs Immature Granulocytes 0.0 0 - 0.4 10e9/L Final         2/20/2018  9:07 PM      Component Results     Component Value Ref Range & Units Status    Sodium 140 133 - 144 mmol/L Final    Potassium 3.5 3.4 - 5.3 mmol/L Final     Chloride 106 94 - 109 mmol/L Final    Carbon Dioxide 27 20 - 32 mmol/L Final    Anion Gap 7 3 - 14 mmol/L Final    Glucose 77 70 - 99 mg/dL Final    Urea Nitrogen 12 7 - 30 mg/dL Final    Creatinine 0.59 0.52 - 1.04 mg/dL Final    GFR Estimate >90 >60 mL/min/1.7m2 Final    Non  GFR Calc    GFR Estimate If Black >90 >60 mL/min/1.7m2 Final    African American GFR Calc    Calcium 8.9 8.5 - 10.1 mg/dL Final    Bilirubin Total 0.8 0.2 - 1.3 mg/dL Final    Albumin 4.2 3.4 - 5.0 g/dL Final    Protein Total 8.0 6.8 - 8.8 g/dL Final    Alkaline Phosphatase 58 40 - 150 U/L Final    ALT 27 0 - 50 U/L Final    AST 30 0 - 45 U/L Final         2/20/2018  9:05 PM      Component Results     Component Value Ref Range & Units Status    Lipase 107 73 - 393 U/L Final         2/20/2018  8:11 PM      Component Results     Component Value Ref Range & Units Status    Color Urine Colorless  Final    Appearance Urine Clear  Final    Glucose Urine Negative NEG^Negative mg/dL Final    Bilirubin Urine Negative NEG^Negative Final    Ketones Urine 5 (A) NEG^Negative mg/dL Final    Specific Gravity Urine 1.003 1.003 - 1.035 Final    Blood Urine Small (A) NEG^Negative Final    pH Urine 7.0 5.0 - 7.0 pH Final    Protein Albumin Urine Negative NEG^Negative mg/dL Final    Urobilinogen mg/dL 0.0 0.0 - 2.0 mg/dL Final    Nitrite Urine Negative NEG^Negative Final    Leukocyte Esterase Urine Negative NEG^Negative Final    Source Midstream Urine  Final    WBC Urine 0 0 - 2 /HPF Final    RBC Urine 0 0 - 2 /HPF Final    Squamous Epithelial /HPF Urine <1 0 - 1 /HPF Final         2/20/2018  8:11 PM      Component Results     Component Value Ref Range & Units Status    HCG Qualitative Serum Negative NEG^Negative Final    This test is for screening purposes.  Results should be interpreted along with   the clinical picture.  Confirmation testing is available if warranted by   ordering [id number], HCG Quantitative Pregnancy.           2/20/2018  9:05  "PM      Narrative     ULTRASOUND PELVIS DOPPLER   WITH TRANSVAGINAL IMAGING  2018 8:54  PM     HISTORY: Quadrant abdominal pain.    COMPARISON: 2016    TECHNIQUE: Transvaginal images were performed to better evaluate the  patient's uterus, ovaries and endometrial stripe. Grayscale, color  Doppler, and Doppler spectral waveform analysis performed.    FINDINGS:    No fibroids are evident. The uterus is normal. Endometrial stripe  measures 12 mm and is normal for patient's age and menstrual status.     The right ovary is normal. The left ovary is normal. Doppler spectral  waveform analysis demonstrates blood flow to both ovaries. No adnexal  masses are present.     Trace free pelvic fluid is present.        Impression     IMPRESSION: Normal pelvic ultrasound with trace free fluid in the  pelvis.    KAI BLUM MD                Thank you for choosing [facility]       Thank you for choosing [facility] for your care. Our goal is always to provide you with excellent care. Hearing back from our patients is one way we can continue to improve our services. Please take a few minutes to complete the written survey that you may receive in the mail after you visit with us. Thank you!        [employer] Information     [employer] lets you send messages to your doctor, view your test results, renew your prescriptions, schedule appointments and more. To sign up, go to [email or web address] . Click on \"Log in\" on the left side of the screen, which will take you to the Welcome page. Then click on \"Sign up Now\" on the right side of the page.     You will be asked to enter the access code listed below, as well as some personal information. Please follow the directions to create your username and password.     Your access code is: [email or web address]  Expires: 2018  9:19 PM     Your access code will  in 90 days. If you need help or a new code, please call your [facility] clinic or [phone].        Care EveryWhere ID     This is " your Care EveryWhere ID. This could be used by other organizations to access your [facility] medical records  [id number]        Equal Access to Services     ZAINAB BURNETTE : Hadii cordell Patel, rolando hutchins, yaya riley, marichuy varela. So [facility] [phone].    ATENCIÓN: Si habla español, tiene a lawrence disposición servicios gratuitos de asistencia lingüística. Llame al [phone].    We comply with applicable federal civil rights laws and [place] laws. We do not discriminate on the basis of race, color, national origin, age, disability, sex, sexual orientation, or gender identity.            After Visit Summary       This is your record. Keep this with you and show to your community pharmacist(s) and doctor(s) at your next visit.

## 2018-02-20 NOTE — ED AVS SNAPSHOT
[facility] Emergency Department    [address] [address]    Phone:  [phone]    Fax:  [phone]                                       Melina Caicedo   MRN: [id number]    Department:  [facility] Emergency Department   Date of Visit:  2/20/2018           After Visit Summary Signature Page     I have received my discharge instructions, and my questions have been answered. I have discussed any challenges I see with this plan with the nurse or doctor.    ..........................................................................................................................................  Patient/Patient Representative Signature      ..........................................................................................................................................  Patient Representative Print Name and Relationship to Patient    ..................................................               ................................................  Date                                            Time    ..........................................................................................................................................  Reviewed by Signature/Title    ...................................................              ..............................................  Date                                                            Time

## 2018-02-21 NOTE — ED PROVIDER NOTES
"HPI  Patient is a 32-year-old female presenting with right lower quadrant abdominal/pelvic pain.  Past medical history is reviewed.  Medications are reviewed.  Notable history of cholecystectomy in 2011.  Known history of tubal ligation.    The patient has a known history of consistent, predictable menses.  However, she had no spotting starting about 3 weeks ago.  This was well before she had expected another menstruation.  She had a full, \"typical\" menstruation occurred 2 weeks ago which was about 2 weeks earlier than expected.  She had associated cramping and discomfort in the pelvis.  Duration was typical for menstrual cycle but her flow is heavy.  She had some mild spotting over the past week as well.  Also, she has been experiencing some pelvic discomfort and cramping that comes and goes over the past 3 weeks.  She thought it was initially related to her early menstruation which is described above but she has continued to have cramping and pain over the past week as well despite a lack of vaginal bleeding.  Her pain has not progressively worsened.  It is present constantly but waxes and wanes.  At its worst it will be rated 4/10.  Currently she rates the pain is mild to moderate.  The pain is felt in the right lower quadrant.  No back pain.  No nausea or vomiting.  No diarrhea or constipation.  No hematochezia or melena.  No vaginal discharge or irritation.  No dysuria, urgency, frequency, or hematuria.  No trauma or injury.  She does describe new discomfort with sexual intercourse.  This is been present over the past 2-3 weeks as well.    ROS: All other review of systems are negative other than that noted above.     Past Medical History:   Diagnosis Date     Anxiety      Asthma      Bipolar affective (H)      Depressive disorder      Past Surgical History:   Procedure Laterality Date     CHOLECYSTECTOMY  6/2011     ENDOSCOPY UPPER WITH PANCREATIC STIMULATION       No family history on file.  Social History "   Substance Use Topics     Smoking status: Never Smoker     Smokeless tobacco: Never Used     Alcohol use No         PHYSICAL  /75  Temp 98.6  F (37  C) (Oral)  Resp 18  General: Patient is alert and in mild to moderate distress.  Pleasant, conversational but concerned.  Neurological: Alert.  Moving upper and lower extremities equally, bilaterally.  Head / Neck: Atraumatic.  Ears: Not done.  Eyes: Pupils are equal, round, and reactive.  Normal conjunctiva.  Nose: Midline.  No epistaxis.  Mouth / Throat: No ulcerations or lesions.  Upper pharynx is not erythematous.  Moist.  Respiratory: No respiratory distress. CTA B.  Cardiovascular: Regular rhythm.  Peripheral extremities are warm.  No edema.  No calf tenderness.  Abdomen / Pelvis: Moderate tenderness in the right lower quadrant and midline pelvis.  She will grimace but not guarding.  No distention.  Soft throughout.  Genitalia: Not done.  Musculoskeletal: No tenderness over major muscles and joints.  Skin: No evidence of rash or trauma.        ED COURSE  1934.  Patient has new pelvic/right lower quadrant pain over the past 3 weeks.  She has had vaginal bleeding as well.  Not currently bleeding but still having pelvic pain and cramping.  Pregnancy test pending.  Lab values pending.  Urine analysis pending.  Imaging will be ordered after results return.    Labs Ordered and Resulted from Time of ED Arrival Up to the Time of Departure from the ED   ROUTINE UA WITH MICROSCOPIC REFLEX TO CULTURE - Abnormal; Notable for the following:        Result Value    Ketones Urine 5 (*)     Blood Urine Small (*)     All other components within normal limits   CBC WITH PLATELETS DIFFERENTIAL   COMPREHENSIVE METABOLIC PANEL   LIPASE   HCG QUALITATIVE     2020.  Patient has a normal white blood cell count.  Urine analysis is unremarkable.  She is not pregnant.  Ultrasound of the pelvis is ordered.    IMAGING  Images reviewed by me.  Radiology report also reviewed.  Pelvic  Ultrasound (US) with doppler imaging (r/o ovarian torsion)   Final Result   IMPRESSION: Normal pelvic ultrasound with trace free fluid in the   pelvis.      KAI BLUM MD        2115.  Ultrasound is unremarkable.  Lab values are unremarkable.  We discussed the option of getting a CT scan to look for appendicitis or other missed intra-abdominal process versus follow-up with her primary doctor/gynecologist as scheduled next week.  She would like to follow up next week and not pursue further evaluation here.  Zofran prescription provided.  I suspect this is going to be menstruation related pain and associated symptoms.      IMPRESSION    ICD-10-CM    1. RLQ abdominal pain R10.31            Critical Care time:  none                    Albino Lynn MD  02/20/18 2115

## 2018-02-21 NOTE — DISCHARGE INSTRUCTIONS
Return to the Emergency Room if the following occurs:     Worsened pain, fever >101, vomiting/dehydration, or for any concern at anytime.    Or, follow-up with the following provider as we discussed:     Return to your primary doctor / gynecologist as scheduled.    Medications discussed:    Ibuprofen 600 mg every six hours for pain (7 days duration).  Tylenol 1000 mg every six hours for pain (7 days duration).  Therefore, you can alternate these every three hours and do it safely.  Zofran for nausea, as needed.    If you received pain-relieving or sedating medication during your time in the ER, avoid alcohol, driving automobiles, or working with machinery.  Also, a responsible adult must stay with you.        Call the Nurse Advice Line at [phone] or [phone] for any concern at anytime.

## 2018-03-14 ENCOUNTER — HOSPITAL ENCOUNTER (EMERGENCY)
Facility: CLINIC | Age: 33
Discharge: HOME | End: 2018-03-15
Attending: EMERGENCY MEDICINE | Admitting: EMERGENCY MEDICINE
Payer: COMMERCIAL

## 2018-03-14 ENCOUNTER — APPOINTMENT (OUTPATIENT)
Dept: CT IMAGING | Facility: CLINIC | Age: 33
End: 2018-03-14
Attending: EMERGENCY MEDICINE
Payer: COMMERCIAL

## 2018-03-14 DIAGNOSIS — R10.84 ABDOMINAL PAIN, GENERALIZED: ICD-10-CM

## 2018-03-14 LAB
ALBUMIN SERPL-MCNC: 3.5 G/DL (ref 3.4–5)
ALBUMIN UR-MCNC: NEGATIVE MG/DL
ALP SERPL-CCNC: 42 U/L (ref 40–150)
ALT SERPL W P-5'-P-CCNC: 22 U/L (ref 0–50)
ANION GAP SERPL CALCULATED.3IONS-SCNC: 9 MMOL/L (ref 3–14)
APPEARANCE UR: CLEAR
AST SERPL W P-5'-P-CCNC: 22 U/L (ref 0–45)
BASOPHILS # BLD AUTO: 0 10E9/L (ref 0–0.2)
BASOPHILS NFR BLD AUTO: 0.5 %
BILIRUB SERPL-MCNC: 0.3 MG/DL (ref 0.2–1.3)
BILIRUB UR QL STRIP: NEGATIVE
BUN SERPL-MCNC: 11 MG/DL (ref 7–30)
CALCIUM SERPL-MCNC: 8.1 MG/DL (ref 8.5–10.1)
CHLORIDE SERPL-SCNC: 106 MMOL/L (ref 94–109)
CO2 SERPL-SCNC: 24 MMOL/L (ref 20–32)
COLOR UR AUTO: COLORLESS
CREAT SERPL-MCNC: 0.52 MG/DL (ref 0.52–1.04)
DIFFERENTIAL METHOD BLD: ABNORMAL
EOSINOPHIL # BLD AUTO: 0.1 10E9/L (ref 0–0.7)
EOSINOPHIL NFR BLD AUTO: 1.7 %
ERYTHROCYTE [DISTWIDTH] IN BLOOD BY AUTOMATED COUNT: 11.9 % (ref 10–15)
GFR SERPL CREATININE-BSD FRML MDRD: >90 ML/MIN/1.7M2
GLUCOSE SERPL-MCNC: 79 MG/DL (ref 70–99)
GLUCOSE UR STRIP-MCNC: NEGATIVE MG/DL
HCG UR QL: NEGATIVE
HCT VFR BLD AUTO: 33.6 % (ref 35–47)
HGB BLD-MCNC: 11.4 G/DL (ref 11.7–15.7)
HGB UR QL STRIP: NEGATIVE
IMM GRANULOCYTES # BLD: 0 10E9/L (ref 0–0.4)
IMM GRANULOCYTES NFR BLD: 0.2 %
KETONES UR STRIP-MCNC: NEGATIVE MG/DL
LEUKOCYTE ESTERASE UR QL STRIP: NEGATIVE
LIPASE SERPL-CCNC: 108 U/L (ref 73–393)
LYMPHOCYTES # BLD AUTO: 2.3 10E9/L (ref 0.8–5.3)
LYMPHOCYTES NFR BLD AUTO: 36.3 %
MCH RBC QN AUTO: 29.3 PG (ref 26.5–33)
MCHC RBC AUTO-ENTMCNC: 33.9 G/DL (ref 31.5–36.5)
MCV RBC AUTO: 86 FL (ref 78–100)
MONOCYTES # BLD AUTO: 0.4 10E9/L (ref 0–1.3)
MONOCYTES NFR BLD AUTO: 6.6 %
NEUTROPHILS # BLD AUTO: 3.5 10E9/L (ref 1.6–8.3)
NEUTROPHILS NFR BLD AUTO: 54.7 %
NITRATE UR QL: NEGATIVE
PH UR STRIP: 7 PH (ref 5–7)
PLATELET # BLD AUTO: 326 10E9/L (ref 150–450)
POTASSIUM SERPL-SCNC: 3.6 MMOL/L (ref 3.4–5.3)
PROT SERPL-MCNC: 6.4 G/DL (ref 6.8–8.8)
RBC # BLD AUTO: 3.89 10E12/L (ref 3.8–5.2)
SODIUM SERPL-SCNC: 139 MMOL/L (ref 133–144)
SOURCE: ABNORMAL
SP GR UR STRIP: 1 (ref 1–1.03)
UROBILINOGEN UR STRIP-MCNC: 0 MG/DL (ref 0–2)
WBC # BLD AUTO: 6.4 10E9/L (ref 4–11)

## 2018-03-14 PROCEDURE — 81025 URINE PREGNANCY TEST: CPT | Performed by: EMERGENCY MEDICINE

## 2018-03-14 PROCEDURE — 99284 EMERGENCY DEPT VISIT MOD MDM: CPT | Mod: Z6 | Performed by: EMERGENCY MEDICINE

## 2018-03-14 PROCEDURE — 25000125 ZZHC RX 250: Performed by: EMERGENCY MEDICINE

## 2018-03-14 PROCEDURE — 85025 COMPLETE CBC W/AUTO DIFF WBC: CPT | Performed by: EMERGENCY MEDICINE

## 2018-03-14 PROCEDURE — 25000128 H RX IP 250 OP 636: Performed by: EMERGENCY MEDICINE

## 2018-03-14 PROCEDURE — 74177 CT ABD & PELVIS W/CONTRAST: CPT

## 2018-03-14 PROCEDURE — 83690 ASSAY OF LIPASE: CPT | Performed by: EMERGENCY MEDICINE

## 2018-03-14 PROCEDURE — 81003 URINALYSIS AUTO W/O SCOPE: CPT | Performed by: EMERGENCY MEDICINE

## 2018-03-14 PROCEDURE — 99285 EMERGENCY DEPT VISIT HI MDM: CPT | Mod: 25 | Performed by: EMERGENCY MEDICINE

## 2018-03-14 PROCEDURE — 96374 THER/PROPH/DIAG INJ IV PUSH: CPT | Performed by: EMERGENCY MEDICINE

## 2018-03-14 PROCEDURE — 25000132 ZZH RX MED GY IP 250 OP 250 PS 637: Performed by: EMERGENCY MEDICINE

## 2018-03-14 PROCEDURE — 96361 HYDRATE IV INFUSION ADD-ON: CPT | Performed by: EMERGENCY MEDICINE

## 2018-03-14 PROCEDURE — 80053 COMPREHEN METABOLIC PANEL: CPT | Performed by: EMERGENCY MEDICINE

## 2018-03-14 RX ORDER — IOPAMIDOL 755 MG/ML
66 INJECTION, SOLUTION INTRAVASCULAR ONCE
Status: COMPLETED | OUTPATIENT
Start: 2018-03-14 | End: 2018-03-15

## 2018-03-14 RX ORDER — ONDANSETRON 2 MG/ML
4 INJECTION INTRAMUSCULAR; INTRAVENOUS ONCE
Status: COMPLETED | OUTPATIENT
Start: 2018-03-14 | End: 2018-03-14

## 2018-03-14 RX ADMIN — SODIUM CHLORIDE, POTASSIUM CHLORIDE, SODIUM LACTATE AND CALCIUM CHLORIDE 1000 ML: 600; 310; 30; 20 INJECTION, SOLUTION INTRAVENOUS at 23:03

## 2018-03-14 RX ADMIN — LIDOCAINE HYDROCHLORIDE 30 ML: 20 SOLUTION ORAL; TOPICAL at 23:59

## 2018-03-14 RX ADMIN — ONDANSETRON 4 MG: 2 INJECTION INTRAMUSCULAR; INTRAVENOUS at 23:03

## 2018-03-14 NOTE — LETTER
March 15, 2018      To Whom It May Concern:      Melina CHRISTI Caicedo was seen in our Emergency Department today, 03/15/18.  Please excuse her from work on 3/15/18.    Sincerely,        Jonh Inman MD

## 2018-03-14 NOTE — ED AVS SNAPSHOT
[facility] Emergency Department    [address] [address]    Phone:  [phone]    Fax:  [phone]                                       Melina Caicedo   MRN: [id number]    Department:  [facility] Emergency Department   Date of Visit:  3/14/2018           Patient Information     Date Of Birth          1985        Your diagnoses for this visit were:     Abdominal pain, generalized        You were seen by Jonh Inman MD.        Discharge Instructions       We have not found a good cause for your abdominal pain, as all tests so far have not shown us the reason you're in pain.      Therefore, it is very important for you to follow up here or at your regular doctor's office tomorrow, in 24 hours, so that we can re-check your pain and see how you are doing.      Please come back sooner if you have worsening abdominal pain, intractable vomiting, fevers/chills, increasing malaise, or for any other worsening of your condition as you see fit.     Try the Miralax powder to help make your stools softer.  Start with 1-2 capful mixed in 6-8 oz of any liquid once a day.  If that does not help her have softer stools, try increasing it to 2-3 capful in 6-8 oz once a day or 1-2 capful in 6-8 oz twice a day.  You can increase or decrease the amount as needed to achieve one to two soft stools per day.      24 Hour Appointment Hotline       To make an appointment at any [facility] clinic, call [phone] ([phone]). If you don't have a family doctor or clinic, we will help you find one. [facility] clinics are conveniently located to serve the needs of you and your family.             Review of your medicines      START taking        Dose / Directions Last dose taken    polyethylene glycol powder   Commonly known as:  MIRALAX   Dose:  1 capful   Quantity:  527 g        Take 17 g (1 capful) by mouth daily   Refills:  0          Our records show that you are taking the medicines  listed below. If these are incorrect, please call your family doctor or clinic.        Dose / Directions Last dose taken    fluticasone-salmeterol 250-50 MCG/DOSE diskus inhaler   Commonly known as:  ADVAIR   Dose:  1 puff        Inhale 1 puff into the lungs every 12 hours.   Refills:  0        IBUPROFEN PO   Dose:  400 mg        Take 400 mg by mouth every 4 hours as needed for moderate pain   Refills:  0        isometheptene-dichloralphenazone-acetaminophen -325 MG per capsule   Commonly known as:  MIDRIN   Dose:  1 capsule        Take 1 capsule by mouth every 4 hours as needed for headaches   Refills:  0        magnesium 250 MG tablet   Dose:  1 tablet        Take 1 tablet by mouth daily   Refills:  0        nicotine polacrilex 2 MG lozenge   Commonly known as:  COMMIT        every hour as needed   Refills:  0        ondansetron 4 MG ODT tab   Commonly known as:  ZOFRAN ODT   Dose:  4-8 mg   Quantity:  10 tablet        Take 1-2 tablets (4-8 mg) by mouth every 6 hours as needed for nausea   Refills:  0        prenatal multivitamin  with iron 28-0.8 MG Tabs   Dose:  1 tablet        Take 1 tablet by mouth daily.   Refills:  0        senna-docusate 8.6-50 MG per tablet   Commonly known as:  SENOKOT-S;PERICOLACE   Dose:  2 tablet        Take 2 tablets by mouth daily   Refills:  0        venlafaxine 37.5 MG 24 hr capsule   Commonly known as:  EFFEXOR-XR   Dose:  37.5 mg        Take 37.5 mg by mouth daily   Refills:  0        VITAMIN B-2 PO   Dose:  25 mg        Take 25 mg by mouth daily   Refills:  0        ZYRTEC ALLERGY 10 MG tablet   Dose:  10 mg   Generic drug:  cetirizine        Take 10 mg by mouth At Bedtime.   Refills:  0                Prescriptions were sent or printed at these locations (1 Prescription)                   Other Prescriptions                Printed at Department/Unit printer (1 of 1)         polyethylene glycol (MIRALAX) powder                Procedures and tests performed during your  visit     CBC with platelets differential    CT Abdomen Pelvis w Contrast    Comprehensive metabolic panel    HCG qualitative urine    Lipase    Peripheral IV catheter    UA reflex to Microscopic      Orders Needing Specimen Collection     None      Pending Results     Date and Time Order Name Status Description    3/14/2018 2346 CT Abdomen Pelvis w Contrast Preliminary             Pending Culture Results     No orders found for last 3 day(s).            Pending Results Instructions     If you had any lab results that were not finalized at the time of your Discharge, you can call the ED Lab Result RN at [phone]. You will be contacted by this team for any positive Lab results or changes in treatment. The nurses are available 7 days a week from 10A to 6:30P.  You can leave a message 24 hours per day and they will return your call.        Test Results From Your Hospital Stay        3/14/2018 10:25 PM      Component Results     Component Value Ref Range & Units Status    Color Urine Colorless  Final    Appearance Urine Clear  Final    Glucose Urine Negative NEG^Negative mg/dL Final    Bilirubin Urine Negative NEG^Negative Final    Ketones Urine Negative NEG^Negative mg/dL Final    Specific Gravity Urine 1.000 (L) 1.003 - 1.035 Final    Blood Urine Negative NEG^Negative Final    pH Urine 7.0 5.0 - 7.0 pH Final    Protein Albumin Urine Negative NEG^Negative mg/dL Final    Urobilinogen mg/dL 0.0 0.0 - 2.0 mg/dL Final    Nitrite Urine Negative NEG^Negative Final    Leukocyte Esterase Urine Negative NEG^Negative Final    Source Midstream Urine  Final         3/14/2018 10:28 PM      Component Results     Component Value Ref Range & Units Status    HCG Qual Urine Negative NEG^Negative Final    This test is for screening purposes.  Results should be interpreted along with   the clinical picture.  Confirmation testing is available if warranted by   ordering [id number], HCG Quantitative Pregnancy.           3/14/2018 11:21 PM       Component Results     Component Value Ref Range & Units Status    WBC 6.4 4.0 - 11.0 10e9/L Final    RBC Count 3.89 3.8 - 5.2 10e12/L Final    Hemoglobin 11.4 (L) 11.7 - 15.7 g/dL Final    Hematocrit 33.6 (L) 35.0 - 47.0 % Final    MCV 86 78 - 100 fl Final    MCH 29.3 26.5 - 33.0 pg Final    MCHC 33.9 31.5 - 36.5 g/dL Final    RDW 11.9 10.0 - 15.0 % Final    Platelet Count 326 150 - 450 10e9/L Final    Diff Method Automated Method  Final    % Neutrophils 54.7 % Final    % Lymphocytes 36.3 % Final    % Monocytes 6.6 % Final    % Eosinophils 1.7 % Final    % Basophils 0.5 % Final    % Immature Granulocytes 0.2 % Final    Absolute Neutrophil 3.5 1.6 - 8.3 10e9/L Final    Absolute Lymphocytes 2.3 0.8 - 5.3 10e9/L Final    Absolute Monocytes 0.4 0.0 - 1.3 10e9/L Final    Absolute Eosinophils 0.1 0.0 - 0.7 10e9/L Final    Absolute Basophils 0.0 0.0 - 0.2 10e9/L Final    Abs Immature Granulocytes 0.0 0 - 0.4 10e9/L Final         3/14/2018 11:22 PM      Component Results     Component Value Ref Range & Units Status    Sodium 139 133 - 144 mmol/L Final    Potassium 3.6 3.4 - 5.3 mmol/L Final    Chloride 106 94 - 109 mmol/L Final    Carbon Dioxide 24 20 - 32 mmol/L Final    Anion Gap 9 3 - 14 mmol/L Final    Glucose 79 70 - 99 mg/dL Final    Urea Nitrogen 11 7 - 30 mg/dL Final    Creatinine 0.52 0.52 - 1.04 mg/dL Final    GFR Estimate >90 >60 mL/min/1.7m2 Final    Non  GFR Calc    GFR Estimate If Black >90 >60 mL/min/1.7m2 Final    African American GFR Calc    Calcium 8.1 (L) 8.5 - 10.1 mg/dL Final    Bilirubin Total 0.3 0.2 - 1.3 mg/dL Final    Albumin 3.5 3.4 - 5.0 g/dL Final    Protein Total 6.4 (L) 6.8 - 8.8 g/dL Final    Alkaline Phosphatase 42 40 - 150 U/L Final    ALT 22 0 - 50 U/L Final    AST 22 0 - 45 U/L Final         3/14/2018 11:19 PM      Component Results     Component Value Ref Range & Units Status    Lipase 108 73 - 393 U/L Final         3/15/2018 12:25 AM      Narrative     CT ABDOMEN  "PELVIS W CONTRAST  3/15/2018 12:15 AM     HISTORY: Right flank pain, nausea.     TECHNIQUE: Volumetric acquisition through abdomen and pelvis with IV  contrast. 66 mL Isovue-370  Radiation dose for this scan was reduced  using automated exposure control, adjustment of the mA and/or kV  according to patient size, or iterative reconstruction technique.    COMPARISON: None.    FINDINGS: Mild fatty infiltration of the liver. Gallbladder is absent.  Pancreas, spleen, adrenal glands and kidneys demonstrate no worrisome  findings. Symmetric renal enhancement. No hydronephrosis. Visualized  lung bases are clear.    Uterus is present. 2 cm right adnexal cyst, likely an ovarian cyst.  Trace free fluid in the pelvis. Negative appendix. Moderate stool in  the right colon. No focal inflammatory changes or bowel obstruction.        Impression     IMPRESSION:  1. Small right adnexal/ovarian cyst, likely physiologic. Trace free  fluid in the pelvis.  2. No other acute cause of pain demonstrated.  3. Moderate stool in the colon.                Thank you for choosing [facility]       Thank you for choosing [facility] for your care. Our goal is always to provide you with excellent care. Hearing back from our patients is one way we can continue to improve our services. Please take a few minutes to complete the written survey that you may receive in the mail after you visit with us. Thank you!        [facility] Management Information     [employer] lets you send messages to your doctor, view your test results, renew your prescriptions, schedule appointments and more. To sign up, go to [email or web address] Click on \"Log in\" on the left side of the screen, which will take you to the Welcome page. Then click on \"Sign up Now\" on the right side of the page.     You will be asked to enter the access code listed below, as well as some personal information. Please follow the directions to create your username and password.     Your access code is: " [email or web address]  Expires: 2018 10:19 PM     Your access code will  in 90 days. If you need help or a new code, please call your [facility] clinic or [phone].        Care EveryWhere ID     This is your Care EveryWhere ID. This could be used by other organizations to access your [facility] medical records  [id number]        Equal Access to Services     [facility] CenterDIONISIO : Hadii cordell Patel, waaxda lupatriaadaha, qaybta kaalmada rosemary, marichuy lutz . So [facility] [phone].    ATENCIÓN: Si habla español, tiene a lawrence disposición servicios gratuitos de asistencia lingüística. Llame al [phone].    We comply with applicable federal civil rights laws and [place] laws. We do not discriminate on the basis of race, color, national origin, age, disability, sex, sexual orientation, or gender identity.            After Visit Summary       This is your record. Keep this with you and show to your community pharmacist(s) and doctor(s) at your next visit.

## 2018-03-14 NOTE — ED AVS SNAPSHOT
[facility] Emergency Department    [address] [address]    Phone:  [phone]    Fax:  [phone]                                       Melina Caicedo   MRN: [id number]    Department:  [facility] Emergency Department   Date of Visit:  3/14/2018           After Visit Summary Signature Page     I have received my discharge instructions, and my questions have been answered. I have discussed any challenges I see with this plan with the nurse or doctor.    ..........................................................................................................................................  Patient/Patient Representative Signature      ..........................................................................................................................................  Patient Representative Print Name and Relationship to Patient    ..................................................               ................................................  Date                                            Time    ..........................................................................................................................................  Reviewed by Signature/Title    ...................................................              ..............................................  Date                                                            Time

## 2018-03-15 VITALS
DIASTOLIC BLOOD PRESSURE: 95 MMHG | OXYGEN SATURATION: 99 % | SYSTOLIC BLOOD PRESSURE: 133 MMHG | RESPIRATION RATE: 16 BRPM | HEIGHT: 61 IN | TEMPERATURE: 98.6 F | WEIGHT: 135 LBS | HEART RATE: 65 BPM | BODY MASS INDEX: 25.49 KG/M2

## 2018-03-15 PROCEDURE — 25000128 H RX IP 250 OP 636: Performed by: EMERGENCY MEDICINE

## 2018-03-15 PROCEDURE — 25000125 ZZHC RX 250: Performed by: EMERGENCY MEDICINE

## 2018-03-15 PROCEDURE — 74177 CT ABD & PELVIS W/CONTRAST: CPT

## 2018-03-15 RX ORDER — POLYETHYLENE GLYCOL 3350 17 G/17G
1 POWDER, FOR SOLUTION ORAL DAILY
Qty: 527 G | Refills: 0 | Status: SHIPPED | OUTPATIENT
Start: 2018-03-15 | End: 2018-04-14

## 2018-03-15 RX ADMIN — IOPAMIDOL 66 ML: 755 INJECTION, SOLUTION INTRAVENOUS at 00:07

## 2018-03-15 RX ADMIN — SODIUM CHLORIDE 56 ML: 9 INJECTION, SOLUTION INTRAVENOUS at 00:07

## 2018-03-15 NOTE — ED NOTES
recently finished 2 weeks of antibiotics for PID has had worsening R flank and low back pain significantly more so today  No fever

## 2018-03-15 NOTE — ED PROVIDER NOTES
History     Chief Complaint   Patient presents with     Flank Pain     Rt side into lower back     HPI  Melina Caicedo is a 32 year old female who presents for right flank pain.  Symptoms started tonight after eating dinner.  Pain is sharp, rated as moderate in severity.  She took ibuprofen with some improvement.  He has she reports nausea but no vomiting.  No diarrhea.  She has felt chilled but denies any fevers.  She reports that she has had urinary frequency for the past 2 days but denies any dysuria.  She says that 2 weeks ago she was treated for PID and reports that she has finished her treatment and says that the vaginal discharge is resolved.  She denies any chest pain, difficulty breathing, pain when she takes a deep breath, or recent travel.  She has had a prior cholecystectomy,  section, and tubal ligation    Problem List:    Patient Active Problem List    Diagnosis Date Noted     Major depressive disorder 10/21/2016     Priority: Medium     Overview:   Major depression especially post partum.       Hematuria 10/10/2013     Priority: Medium     Heart murmur 2013     Priority: Medium     Overview:   Auscultated on 13.  No history of murmur according to patient and chart.       Persistent asthma 2012     Priority: Medium     Anxiety 2007     Priority: Medium        Past Medical History:    Past Medical History:   Diagnosis Date     Anxiety      Asthma      Bipolar affective (H)      Depressive disorder        Past Surgical History:    Past Surgical History:   Procedure Laterality Date     CHOLECYSTECTOMY  2011     ENDOSCOPY UPPER WITH PANCREATIC STIMULATION         Family History:    No family history on file.    Social History:  Marital Status:  Single [1]  Social History   Substance Use Topics     Smoking status: Never Smoker     Smokeless tobacco: Never Used     Alcohol use No        Medications:      polyethylene glycol (MIRALAX) powder   nicotine polacrilex  "(COMMIT) 2 MG lozenge   Riboflavin (VITAMIN B-2 PO)   IBUPROFEN PO   ondansetron (ZOFRAN ODT) 4 MG ODT tab   isometheptene-dichloralphenazone-acetaminophen (MIDRIN) -325 MG per capsule   venlafaxine (EFFEXOR-XR) 37.5 MG 24 hr capsule   magnesium 250 MG tablet   senna-docusate (SENOKOT-S;PERICOLACE) 8.6-50 MG per tablet   cetirizine (ZYRTEC ALLERGY) 10 MG tablet   Prenatal Vit-Fe Fumarate-FA (PRENATAL MULTIVITAMIN  WITH IRON) 28-0.8 MG TABS   fluticasone-salmeterol (ADVAIR) 250-50 MCG/DOSE diskus inhaler         Review of Systems  Pertinent positives and negatives listed in the HPI, all other systems reviewed and are negative.    Physical Exam   BP: 154/83  Pulse: 65  Temp: 98.6  F (37  C)  Resp: 16  Height: 154.9 cm (5' 1\")  Weight: 61.2 kg (135 lb)  SpO2: 100 %      Physical Exam   Constitutional: She is oriented to person, place, and time. She appears well-developed and well-nourished. She appears distressed.   HENT:   Head: Normocephalic and atraumatic.   Right Ear: External ear normal.   Left Ear: External ear normal.   Nose: Nose normal.   Eyes: Conjunctivae are normal. No scleral icterus.   Neck: Normal range of motion.   Cardiovascular: Normal rate and regular rhythm.    Pulmonary/Chest: Effort normal. No stridor. No respiratory distress.   Abdominal: Soft. She exhibits no distension. There is tenderness in the right upper quadrant and right lower quadrant. There is CVA tenderness (right). There is no rigidity, no rebound and no guarding.   Neurological: She is alert and oriented to person, place, and time.   Skin: Skin is warm and dry. She is not diaphoretic.   Psychiatric: She has a normal mood and affect. Her behavior is normal.   Nursing note and vitals reviewed.      ED Course     ED Course     Procedures               Critical Care time:  none               Results for orders placed or performed during the hospital encounter of 03/14/18 (from the past 24 hour(s))   UA reflex to Microscopic "   Result Value Ref Range    Color Urine Colorless     Appearance Urine Clear     Glucose Urine Negative NEG^Negative mg/dL    Bilirubin Urine Negative NEG^Negative    Ketones Urine Negative NEG^Negative mg/dL    Specific Gravity Urine 1.000 (L) 1.003 - 1.035    Blood Urine Negative NEG^Negative    pH Urine 7.0 5.0 - 7.0 pH    Protein Albumin Urine Negative NEG^Negative mg/dL    Urobilinogen mg/dL 0.0 0.0 - 2.0 mg/dL    Nitrite Urine Negative NEG^Negative    Leukocyte Esterase Urine Negative NEG^Negative    Source Midstream Urine    HCG qualitative urine   Result Value Ref Range    HCG Qual Urine Negative NEG^Negative   CBC with platelets differential   Result Value Ref Range    WBC 6.4 4.0 - 11.0 10e9/L    RBC Count 3.89 3.8 - 5.2 10e12/L    Hemoglobin 11.4 (L) 11.7 - 15.7 g/dL    Hematocrit 33.6 (L) 35.0 - 47.0 %    MCV 86 78 - 100 fl    MCH 29.3 26.5 - 33.0 pg    MCHC 33.9 31.5 - 36.5 g/dL    RDW 11.9 10.0 - 15.0 %    Platelet Count 326 150 - 450 10e9/L    Diff Method Automated Method     % Neutrophils 54.7 %    % Lymphocytes 36.3 %    % Monocytes 6.6 %    % Eosinophils 1.7 %    % Basophils 0.5 %    % Immature Granulocytes 0.2 %    Absolute Neutrophil 3.5 1.6 - 8.3 10e9/L    Absolute Lymphocytes 2.3 0.8 - 5.3 10e9/L    Absolute Monocytes 0.4 0.0 - 1.3 10e9/L    Absolute Eosinophils 0.1 0.0 - 0.7 10e9/L    Absolute Basophils 0.0 0.0 - 0.2 10e9/L    Abs Immature Granulocytes 0.0 0 - 0.4 10e9/L   Comprehensive metabolic panel   Result Value Ref Range    Sodium 139 133 - 144 mmol/L    Potassium 3.6 3.4 - 5.3 mmol/L    Chloride 106 94 - 109 mmol/L    Carbon Dioxide 24 20 - 32 mmol/L    Anion Gap 9 3 - 14 mmol/L    Glucose 79 70 - 99 mg/dL    Urea Nitrogen 11 7 - 30 mg/dL    Creatinine 0.52 0.52 - 1.04 mg/dL    GFR Estimate >90 >60 mL/min/1.7m2    GFR Estimate If Black >90 >60 mL/min/1.7m2    Calcium 8.1 (L) 8.5 - 10.1 mg/dL    Bilirubin Total 0.3 0.2 - 1.3 mg/dL    Albumin 3.5 3.4 - 5.0 g/dL    Protein Total 6.4 (L)  6.8 - 8.8 g/dL    Alkaline Phosphatase 42 40 - 150 U/L    ALT 22 0 - 50 U/L    AST 22 0 - 45 U/L   Lipase   Result Value Ref Range    Lipase 108 73 - 393 U/L   CT Abdomen Pelvis w Contrast    Narrative    CT ABDOMEN PELVIS W CONTRAST  3/15/2018 12:15 AM     HISTORY: Right flank pain, nausea.     TECHNIQUE: Volumetric acquisition through abdomen and pelvis with IV  contrast. 66 mL Isovue-370  Radiation dose for this scan was reduced  using automated exposure control, adjustment of the mA and/or kV  according to patient size, or iterative reconstruction technique.    COMPARISON: None.    FINDINGS: Mild fatty infiltration of the liver. Gallbladder is absent.  Pancreas, spleen, adrenal glands and kidneys demonstrate no worrisome  findings. Symmetric renal enhancement. No hydronephrosis. Visualized  lung bases are clear.    Uterus is present. 2 cm right adnexal cyst, likely an ovarian cyst.  Trace free fluid in the pelvis. Negative appendix. Moderate stool in  the right colon. No focal inflammatory changes or bowel obstruction.      Impression    IMPRESSION:  1. Small right adnexal/ovarian cyst, likely physiologic. Trace free  fluid in the pelvis.  2. No other acute cause of pain demonstrated.  3. Moderate stool in the colon.       Medications   ondansetron (ZOFRAN) injection 4 mg (4 mg Intravenous Given 3/14/18 2303)   lactated ringers BOLUS 1,000 mL (0 mLs Intravenous Stopped 3/15/18 0040)   lidocaine (viscous) (XYLOCAINE) 2 % 15 mL, alum & mag hydroxide-simethicone (MYLANTA ES/MAALOX  ES) 15 mL GI Cocktail (30 mLs Oral Given 3/14/18 6589)   iopamidol (ISOVUE-370) solution 66 mL (66 mLs Intravenous Given 3/15/18 0007)   sodium chloride 0.9 % bag 500mL for CT scan flush use (56 mLs As instructed Given 3/15/18 0007)       Assessments & Plan (with Medical Decision Making)   32-year-old female presents for right upper quadrant and right flank pain.  Temperature 37 C, blood pressure 154/83, heart 65, SPO2 100% on room air.   She is given IV ondansetron for symptoms.  Urine pregnancy test negative, unlikely ectopic pregnancy.  Urinalysis negative for signs of infection or blood to suggest pyelonephritis or nephrolithiasis.  White blood cell count 6.4, hemoglobin 11.4.  Electrolytes within normal limits.  LFTs normal no signs of hepatitis.  Lipase normal, no signs of pancreatitis.  On recheck she is still uncomfortable and tender on the right abdomen.  We discussed treatment options at this point including a wait-and-see approach versus CT of the abdomen/pelvis now.  After a discussion of the risks and benefits of each including the risk of radiation exposure the patient is requesting CT of the abdomen.  This is obtained, images reviewed independently as well as radiology read reviewed, she does have a small right ovarian cyst, unclear if this is causing her symptoms, but otherwise no cause of pain is seen.  On recheck she is tolerating oral intake at this point is safe to discharge home with instructions to return if she has worsening symptoms or other concerns, otherwise get rechecked if not better in 12-24 hours.  I have encouraged her to try MiraLAX to see if this would help her symptoms.  The patient is in agreement with this plan.    I have reviewed the nursing notes.    I have reviewed the findings, diagnosis, plan and need for follow up with the patient.       New Prescriptions    POLYETHYLENE GLYCOL (MIRALAX) POWDER    Take 17 g (1 capful) by mouth daily       Final diagnoses:   Abdominal pain, generalized       3/14/2018   [facility] EMERGENCY DEPARTMENT     Jonh Inman MD  03/15/18 0102

## 2018-03-15 NOTE — DISCHARGE INSTRUCTIONS
We have not found a good cause for your abdominal pain, as all tests so far have not shown us the reason you're in pain.      Therefore, it is very important for you to follow up here or at your regular doctor's office tomorrow, in 24 hours, so that we can re-check your pain and see how you are doing.      Please come back sooner if you have worsening abdominal pain, intractable vomiting, fevers/chills, increasing malaise, or for any other worsening of your condition as you see fit.     Try the Miralax powder to help make your stools softer.  Start with 1-2 capful mixed in 6-8 oz of any liquid once a day.  If that does not help her have softer stools, try increasing it to 2-3 capful in 6-8 oz once a day or 1-2 capful in 6-8 oz twice a day.  You can increase or decrease the amount as needed to achieve one to two soft stools per day.

## 2018-06-25 ENCOUNTER — THERAPY VISIT (OUTPATIENT)
Dept: PHYSICAL THERAPY | Facility: CLINIC | Age: 33
End: 2018-06-25
Payer: COMMERCIAL

## 2018-06-25 DIAGNOSIS — M54.2 NECK PAIN: Primary | ICD-10-CM

## 2018-06-25 PROCEDURE — 97161 PT EVAL LOW COMPLEX 20 MIN: CPT | Mod: GP | Performed by: PHYSICAL THERAPIST

## 2018-06-25 PROCEDURE — 97110 THERAPEUTIC EXERCISES: CPT | Mod: GP | Performed by: PHYSICAL THERAPIST
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
## 2018-06-25 NOTE — MR AVS SNAPSHOT
After Visit Summary   6/25/2018    Melina Caicedo    MRN: [id number]           Patient Information     Date Of Birth          1985        Visit Information        Provider Department      6/25/2018 11:55 AM Patrick Merlos, PT [facility]        Today's Diagnoses     Neck pain    -  1       Follow-ups after your visit        Your next 10 appointments already scheduled     Jul 02, 2018 11:55 AM CDT   [facility] Spine with Milan Guajardo PT   [facility] ([facility])    [id number]  [facility] [id number] [phone]            Jul 10, 2018 11:55 AM CDT   [facility]RI[facility] Spine with Milan Guajardo PT   [facility] ([facility])    [address]  [facility] [id number] [phone]              Who to contact     If you have questions or need follow up information about today's clinic visit or your schedule please contact [facility] directly at [phone].  Normal or non-critical lab and imaging results will be communicated to you by MyChart, letter or phone within 4 business days after the clinic has received the results. If you do not hear from us within 7 days, please contact the clinic through MyChart or phone. If you have a critical or abnormal lab result, we will notify you by phone as soon as possible.  Submit refill requests through i[employer] or call your pharmacy and they will forward the refill request to us. Please allow 3 business days for your refill to be completed.          Additional Information About Your Visit        Care EveryWhere ID     This is your Care EveryWhere ID. This could be used by other organizations to access your [facility] medical records  [id number]         Blood Pressure from Last 3 Encounters:   03/15/18 (!) 133/95   02/20/18 128/75   11/02/17 128/86    Weight from Last 3 Encounters:   03/14/18 61.2 kg (135 lb)   11/02/17 59 kg (130 lb)   05/15/17 68 kg (150 lb)              We  Performed the Following     HC PT EVAL, LOW COMPLEXITY     BREI INITIAL EVAL REPORT     THERAPEUTIC EXERCISES        Primary Care Provider Office Phone # Fax #    Moises Duron [phone]       [facility] [address] [facility] [address]        Equal Access to Services     ZAINAB BURNETTE : Hadii aad ku hadasho Soomaali, waaxda luqadaha, qaybta kaalmada adeegyada, waxay flexin hayaan adecedric vargasavrilboo varela. So [facility] [place] [phone].    ATENCIÓN: Si habla español, tiene a lawrence disposición servicios gratuitos de asistencia lingüística. [facility] [phone].    We comply with applicable federal civil rights laws and [place] laws. We do not discriminate on the basis of race, color, national origin, age, disability, sex, sexual orientation, or gender identity.            Thank you!     Thank you for choosing [facility]  for your care. Our goal is always to provide you with excellent care. Hearing back from our patients is one way we can continue to improve our services. Please take a few minutes to complete the written survey that you may receive in the mail after your visit with us. Thank you!             Your Updated Medication List - Protect others around you: Learn how to safely use, store and throw away your medicines at www.disposemymeds.org.          This list is accurate as of 6/25/18  3:05 PM.  Always use your most recent med list.                   Brand Name Dispense Instructions for use Diagnosis    fluticasone-salmeterol 250-50 MCG/DOSE diskus inhaler    ADVAIR     Inhale 1 puff into the lungs every 12 hours.        IBUPROFEN PO      Take 400 mg by mouth every 4 hours as needed for moderate pain        isometheptene-dichloralphenazone-acetaminophen -325 MG per capsule    MIDRIN     Take 1 capsule by mouth every 4 hours as needed for headaches        magnesium 250 MG tablet      Take 1 tablet by mouth daily        nicotine polacrilex 2 MG  lozenge    COMMIT     every hour as needed        ondansetron 4 MG ODT tab    ZOFRAN ODT    10 tablet    Take 1-2 tablets (4-8 mg) by mouth every 6 hours as needed for nausea        prenatal multivitamin  with iron 28-0.8 MG Tabs      Take 1 tablet by mouth daily.        senna-docusate 8.6-50 MG per tablet    SENOKOT-S;PERICOLACE     Take 2 tablets by mouth daily        venlafaxine 37.5 MG 24 hr capsule    EFFEXOR-XR     Take 37.5 mg by mouth daily        VITAMIN B-2 PO      Take 25 mg by mouth daily        ZYRTEC ALLERGY 10 MG tablet   Generic drug:  cetirizine      Take 10 mg by mouth At Bedtime.

## 2018-07-02 ENCOUNTER — THERAPY VISIT (OUTPATIENT)
Dept: PHYSICAL THERAPY | Facility: CLINIC | Age: 33
End: 2018-07-02
Payer: COMMERCIAL

## 2018-07-02 DIAGNOSIS — M54.2 NECK PAIN: ICD-10-CM

## 2018-07-02 PROCEDURE — 97110 THERAPEUTIC EXERCISES: CPT | Mod: GP | Performed by: PHYSICAL THERAPIST

## 2018-07-02 PROCEDURE — 97140 MANUAL THERAPY 1/> REGIONS: CPT | Mod: GP | Performed by: PHYSICAL THERAPIST

## 2018-07-02 PROCEDURE — 97112 NEUROMUSCULAR REEDUCATION: CPT | Mod: GP | Performed by: PHYSICAL THERAPIST

## 2018-07-10 ENCOUNTER — THERAPY VISIT (OUTPATIENT)
Dept: PHYSICAL THERAPY | Facility: CLINIC | Age: 33
End: 2018-07-10
Payer: COMMERCIAL

## 2018-07-10 DIAGNOSIS — M54.2 NECK PAIN: ICD-10-CM

## 2018-07-10 PROCEDURE — 97140 MANUAL THERAPY 1/> REGIONS: CPT | Mod: GP | Performed by: PHYSICAL THERAPIST

## 2018-07-26 ENCOUNTER — THERAPY VISIT (OUTPATIENT)
Dept: PHYSICAL THERAPY | Facility: CLINIC | Age: 33
End: 2018-07-26
Payer: COMMERCIAL

## 2018-07-26 DIAGNOSIS — M54.2 NECK PAIN: ICD-10-CM

## 2018-07-26 PROCEDURE — 97140 MANUAL THERAPY 1/> REGIONS: CPT | Mod: GP | Performed by: PHYSICAL THERAPIST

## 2018-07-26 PROCEDURE — 97110 THERAPEUTIC EXERCISES: CPT | Mod: GP | Performed by: PHYSICAL THERAPIST

## 2018-07-26 NOTE — MR AVS SNAPSHOT
After Visit Summary   7/26/2018    Melina Caicedo    MRN: [id number]           Patient Information     Date Of Birth          1985        Visit Information        Provider Department      7/26/2018 11:55 AM Milan Guajardo PT [facility]        Today's Diagnoses     Neck pain           Follow-ups after your visit        Who to contact     If you have questions or need follow up information about today's clinic visit or your schedule please contact [facility] directly at [phone].  Normal or non-critical lab and imaging results will be communicated to you by MyChart, letter or phone within 4 business days after the clinic has received the results. If you do not hear from us within 7 days, please contact the clinic through MyChart or phone. If you have a critical or abnormal lab result, we will notify you by phone as soon as possible.  Submit refill requests through [email or web address] or call your pharmacy and they will forward the refill request to us. Please allow 3 business days for your refill to be completed.          Additional Information About Your Visit        Care EveryWhere ID     This is your Care EveryWhere ID. This could be used by other organizations to access your [facility] medical records  [id number]         Blood Pressure from Last 3 Encounters:   03/15/18 (!) 133/95   02/20/18 128/75   11/02/17 128/86    Weight from Last 3 Encounters:   03/14/18 61.2 kg (135 lb)   11/02/17 59 kg (130 lb)   05/15/17 68 kg (150 lb)              We Performed the Following     MANUAL THER TECH,1+REGIONS,EA 15 MIN     THERAPEUTIC EXERCISES        Primary Care Provider Office Phone # Fax #    Moises JURGEN Duron [phone]       [facility] [address] [address]        Equal Access to Services     ZAINAB BURNETTE AH: Vance Patel, rolando hutchins, qamarichuy sanchez  arturo lutz ah. So [facility] [phone].    ATENCIÓN: Si elena estrada, tiene a lawrence disposición servicios gratuitos de asistencia lingüística. Davis al [phone].    We comply with applicable federal civil rights laws and [place] laws. We do not discriminate on the basis of race, color, national origin, age, disability, sex, sexual orientation, or gender identity.            Thank you!     Thank you for choosing [facility]  for your care. Our goal is always to provide you with excellent care. Hearing back from our patients is one way we can continue to improve our services. Please take a few minutes to complete the written survey that you may receive in the mail after your visit with us. Thank you!             Your Updated Medication List - Protect others around you: Learn how to safely use, store and throw away your medicines at www.disposemymeds.org.          This list is accurate as of 7/26/18  1:13 PM.  Always use your most recent med list.                   Brand Name Dispense Instructions for use Diagnosis    fluticasone-salmeterol 250-50 MCG/DOSE diskus inhaler    ADVAIR     Inhale 1 puff into the lungs every 12 hours.        IBUPROFEN PO      Take 400 mg by mouth every 4 hours as needed for moderate pain        isometheptene-dichloralphenazone-acetaminophen -325 MG per capsule    MIDRIN     Take 1 capsule by mouth every 4 hours as needed for headaches        magnesium 250 MG tablet      Take 1 tablet by mouth daily        nicotine polacrilex 2 MG lozenge    COMMIT     every hour as needed        ondansetron 4 MG ODT tab    ZOFRAN ODT    10 tablet    Take 1-2 tablets (4-8 mg) by mouth every 6 hours as needed for nausea        prenatal multivitamin  with iron 28-0.8 MG Tabs      Take 1 tablet by mouth daily.        senna-docusate 8.6-50 MG per tablet    SENOKOT-S;PERICOLACE     Take 2 tablets by mouth daily        venlafaxine 37.5 MG 24 hr capsule    EFFEXOR-XR     Take 37.5 mg by  mouth daily        VITAMIN B-2 PO      Take 25 mg by mouth daily        ZYRTEC ALLERGY 10 MG tablet   Generic drug:  cetirizine      Take 10 mg by mouth At Bedtime.

## 2019-04-07 ENCOUNTER — HOSPITAL ENCOUNTER (EMERGENCY)
Facility: CLINIC | Age: 34
Discharge: HOME | End: 2019-04-07
Attending: EMERGENCY MEDICINE | Admitting: EMERGENCY MEDICINE
Payer: COMMERCIAL

## 2019-04-07 ENCOUNTER — APPOINTMENT (OUTPATIENT)
Dept: CT IMAGING | Facility: CLINIC | Age: 34
End: 2019-04-07
Attending: EMERGENCY MEDICINE
Payer: COMMERCIAL

## 2019-04-07 VITALS
SYSTOLIC BLOOD PRESSURE: 122 MMHG | TEMPERATURE: 98.2 F | HEIGHT: 62 IN | BODY MASS INDEX: 26.68 KG/M2 | DIASTOLIC BLOOD PRESSURE: 74 MMHG | WEIGHT: 145 LBS | OXYGEN SATURATION: 96 % | HEART RATE: 72 BPM | RESPIRATION RATE: 16 BRPM

## 2019-04-07 DIAGNOSIS — K59.00 CONSTIPATION, UNSPECIFIED CONSTIPATION TYPE: ICD-10-CM

## 2019-04-07 DIAGNOSIS — R10.11 ABDOMINAL PAIN, RIGHT UPPER QUADRANT: ICD-10-CM

## 2019-04-07 LAB
ALBUMIN SERPL-MCNC: 3.9 G/DL (ref 3.4–5)
ALBUMIN UR-MCNC: NEGATIVE MG/DL
ALP SERPL-CCNC: 54 U/L (ref 40–150)
ALT SERPL W P-5'-P-CCNC: 20 U/L (ref 0–50)
AMORPH CRY #/AREA URNS HPF: ABNORMAL /HPF
ANION GAP SERPL CALCULATED.3IONS-SCNC: 4 MMOL/L (ref 3–14)
APPEARANCE UR: ABNORMAL
AST SERPL W P-5'-P-CCNC: 14 U/L (ref 0–45)
BASOPHILS # BLD AUTO: 0 10E9/L (ref 0–0.2)
BASOPHILS NFR BLD AUTO: 0.7 %
BILIRUB SERPL-MCNC: 0.4 MG/DL (ref 0.2–1.3)
BILIRUB UR QL STRIP: NEGATIVE
BUN SERPL-MCNC: 14 MG/DL (ref 7–30)
CALCIUM SERPL-MCNC: 8.9 MG/DL (ref 8.5–10.1)
CHLORIDE SERPL-SCNC: 110 MMOL/L (ref 94–109)
CO2 SERPL-SCNC: 27 MMOL/L (ref 20–32)
COLOR UR AUTO: YELLOW
CREAT SERPL-MCNC: 0.62 MG/DL (ref 0.52–1.04)
DIFFERENTIAL METHOD BLD: NORMAL
EOSINOPHIL # BLD AUTO: 0.2 10E9/L (ref 0–0.7)
EOSINOPHIL NFR BLD AUTO: 4.7 %
ERYTHROCYTE [DISTWIDTH] IN BLOOD BY AUTOMATED COUNT: 12.5 % (ref 10–15)
GFR SERPL CREATININE-BSD FRML MDRD: >90 ML/MIN/{1.73_M2}
GLUCOSE SERPL-MCNC: 77 MG/DL (ref 70–99)
GLUCOSE UR STRIP-MCNC: NEGATIVE MG/DL
HCG UR QL: NEGATIVE
HCT VFR BLD AUTO: 38.2 % (ref 35–47)
HGB BLD-MCNC: 12.5 G/DL (ref 11.7–15.7)
HGB UR QL STRIP: NEGATIVE
IMM GRANULOCYTES # BLD: 0 10E9/L (ref 0–0.4)
IMM GRANULOCYTES NFR BLD: 0 %
KETONES UR STRIP-MCNC: NEGATIVE MG/DL
LEUKOCYTE ESTERASE UR QL STRIP: NEGATIVE
LIPASE SERPL-CCNC: 107 U/L (ref 73–393)
LYMPHOCYTES # BLD AUTO: 1.5 10E9/L (ref 0.8–5.3)
LYMPHOCYTES NFR BLD AUTO: 36.1 %
MCH RBC QN AUTO: 28.9 PG (ref 26.5–33)
MCHC RBC AUTO-ENTMCNC: 32.7 G/DL (ref 31.5–36.5)
MCV RBC AUTO: 88 FL (ref 78–100)
MONOCYTES # BLD AUTO: 0.5 10E9/L (ref 0–1.3)
MONOCYTES NFR BLD AUTO: 11.4 %
MUCOUS THREADS #/AREA URNS LPF: PRESENT /LPF
NEUTROPHILS # BLD AUTO: 1.9 10E9/L (ref 1.6–8.3)
NEUTROPHILS NFR BLD AUTO: 47.1 %
NITRATE UR QL: NEGATIVE
NRBC # BLD AUTO: 0 10*3/UL
NRBC BLD AUTO-RTO: 0 /100
PH UR STRIP: 8 PH (ref 5–7)
PLATELET # BLD AUTO: 340 10E9/L (ref 150–450)
POTASSIUM SERPL-SCNC: 4.3 MMOL/L (ref 3.4–5.3)
PROT SERPL-MCNC: 7.2 G/DL (ref 6.8–8.8)
RBC # BLD AUTO: 4.32 10E12/L (ref 3.8–5.2)
RBC #/AREA URNS AUTO: 0 /HPF (ref 0–2)
SODIUM SERPL-SCNC: 141 MMOL/L (ref 133–144)
SOURCE: ABNORMAL
SP GR UR STRIP: 1.02 (ref 1–1.03)
SQUAMOUS #/AREA URNS AUTO: <1 /HPF (ref 0–1)
UROBILINOGEN UR STRIP-MCNC: 0 MG/DL (ref 0–2)
WBC # BLD AUTO: 4 10E9/L (ref 4–11)
WBC #/AREA URNS AUTO: 0 /HPF (ref 0–5)

## 2019-04-07 PROCEDURE — 83690 ASSAY OF LIPASE: CPT | Performed by: EMERGENCY MEDICINE

## 2019-04-07 PROCEDURE — 25000125 ZZHC RX 250: Performed by: EMERGENCY MEDICINE

## 2019-04-07 PROCEDURE — 74177 CT ABD & PELVIS W/CONTRAST: CPT

## 2019-04-07 PROCEDURE — 25000128 H RX IP 250 OP 636: Performed by: EMERGENCY MEDICINE

## 2019-04-07 PROCEDURE — 99284 EMERGENCY DEPT VISIT MOD MDM: CPT | Mod: Z6 | Performed by: EMERGENCY MEDICINE

## 2019-04-07 PROCEDURE — 99284 EMERGENCY DEPT VISIT MOD MDM: CPT | Mod: 25 | Performed by: EMERGENCY MEDICINE

## 2019-04-07 PROCEDURE — 81025 URINE PREGNANCY TEST: CPT | Performed by: EMERGENCY MEDICINE

## 2019-04-07 PROCEDURE — 81001 URINALYSIS AUTO W/SCOPE: CPT | Performed by: EMERGENCY MEDICINE

## 2019-04-07 PROCEDURE — 85025 COMPLETE CBC W/AUTO DIFF WBC: CPT | Performed by: EMERGENCY MEDICINE

## 2019-04-07 PROCEDURE — 80053 COMPREHEN METABOLIC PANEL: CPT | Performed by: EMERGENCY MEDICINE

## 2019-04-07 RX ORDER — IOPAMIDOL 755 MG/ML
71 INJECTION, SOLUTION INTRAVASCULAR ONCE
Status: COMPLETED | OUTPATIENT
Start: 2019-04-07 | End: 2019-04-07

## 2019-04-07 RX ORDER — ACETAMINOPHEN AND CODEINE PHOSPHATE 120; 12 MG/5ML; MG/5ML
0.35 SOLUTION ORAL DAILY
COMMUNITY
End: 2019-07-03

## 2019-04-07 RX ORDER — FLUTICASONE PROPIONATE AND SALMETEROL 232; 14 UG/1; UG/1
1 POWDER, METERED RESPIRATORY (INHALATION) 2 TIMES DAILY
COMMUNITY

## 2019-04-07 RX ADMIN — SODIUM CHLORIDE 58 ML: 9 INJECTION, SOLUTION INTRAVENOUS at 13:32

## 2019-04-07 RX ADMIN — IOPAMIDOL 71 ML: 755 INJECTION, SOLUTION INTRAVENOUS at 13:32

## 2019-04-07 ASSESSMENT — MIFFLIN-ST. JEOR: SCORE: 1320.97

## 2019-04-07 NOTE — ED NOTES
Constipation and RLQ pain that radiates to back for approximately 3 weeks.  Patient tried 3 enemas with minimal results.  Uvaldo Gan RN on 4/7/2019 at 11:09 AM

## 2019-04-07 NOTE — DISCHARGE INSTRUCTIONS
Return if symptoms worsen or new symptoms develop.  Follow-up with primary care physician next available.  Drink plenty of fluids.  If increased abdominal pain fevers chills nausea vomiting or blood in stool, please return for recheck begin mag citrate today.

## 2019-04-07 NOTE — ED PROVIDER NOTES
"  History     Chief Complaint   Patient presents with     Constipation     HAS USED LAXATIVES AND ENEMAS     HPI  Melina Caicedo is a 33 year old female with history of anxiety, heart murmur, hematuria, depression, asthma, and IBS who presents to the ED with constipation. The patient reports she was diagnosed with IBS with constipation eight years ago, but for the past 3 weeks her constipation has been 10x worse. She reports doing en enema four days ago and cleared out her bowels. She reports doing 3 more enema in the past 3 days, with the enemas the only time she has been able to pass feces. She reports excretion of \"pencil thin feces\" mixed with a lot of mucous upon excretion with enema. The patient reports eating worsens the pain so she has been trying to fast. She reports taking Miralax the past three nights with no relief. The patient reports feeling bloated in the RUQ. She states this is unusual because she typically feels bloated in her lower abdomen when constipated. She reported in the past she has taken Linzess for IBS which helped her constipation, but made everything run right through her with foul smelling feces. The patient has a history of a cholecystectomy and tubal ligation.  She currently rates her pain a 3 out of 10.  He is crampy in nature.  Patient denies fevers chills.  She is not had any chest pain or shortness of breath.  She denies headache visual changes.  She is not had a sore throat.  She denies any focal numbness weakness any extremity.  She is not had a rash.  She denied any blood in her stool.    Allergies:  Allergies   Allergen Reactions     Citalopram Other (See Comments)     Seizure     Depakote Other (See Comments)     Headache       Fluticasone Other (See Comments)     Nosebleed and headache     Serotonin Reuptake Inhibitors Other (See Comments)     seizure     Valproic Acid Other (See Comments)     Headache       Problem List:    Patient Active Problem List    Diagnosis Date " "Noted     Neck pain 06/25/2018     Priority: Medium     Major depressive disorder 10/21/2016     Priority: Medium     Overview:   Major depression especially post partum.       Hematuria 10/10/2013     Priority: Medium     Heart murmur 09/12/2013     Priority: Medium     Overview:   Auscultated on 9/12/13.  No history of murmur according to patient and chart.       Persistent asthma 11/09/2012     Priority: Medium     Anxiety 02/01/2007     Priority: Medium        Past Medical History:    Past Medical History:   Diagnosis Date     Anxiety      Asthma      Bipolar affective (H)      Depressive disorder        Past Surgical History:    Past Surgical History:   Procedure Laterality Date     CHOLECYSTECTOMY  6/2011     ENDOSCOPY UPPER WITH PANCREATIC STIMULATION         Family History:    No family history on file.    Social History:  Marital Status:  Single [1]  Social History     Tobacco Use     Smoking status: Never Smoker     Smokeless tobacco: Never Used   Substance Use Topics     Alcohol use: No     Drug use: No        Medications:      cetirizine (ZYRTEC ALLERGY) 10 MG tablet   fluticasone-salmeterol (ADVAIR) 250-50 MCG/DOSE diskus inhaler   IBUPROFEN PO   isometheptene-dichloralphenazone-acetaminophen (MIDRIN) -325 MG per capsule   magnesium 250 MG tablet   nicotine polacrilex (COMMIT) 2 MG lozenge   ondansetron (ZOFRAN ODT) 4 MG ODT tab   Prenatal Vit-Fe Fumarate-FA (PRENATAL MULTIVITAMIN  WITH IRON) 28-0.8 MG TABS   Riboflavin (VITAMIN B-2 PO)   senna-docusate (SENOKOT-S;PERICOLACE) 8.6-50 MG per tablet   venlafaxine (EFFEXOR-XR) 37.5 MG 24 hr capsule         ROS  All systems reviewed and other than pertinent positives and negatives in HPI all other systems are negative.    Physical Exam   BP: 118/72  Heart Rate: 74  Temp: 98.2  F (36.8  C)  Resp: 16  Height: 157.5 cm (5' 2\")  Weight: 65.8 kg (145 lb)  SpO2: 100 %      Physical Exam   Constitutional: She appears well-developed and well-nourished. No " distress.   Eyes: Conjunctivae are normal.   Psychiatric: She has a normal mood and affect.   Nursing note and vitals reviewed.     HENT: Oral mucosa moist. No lesions.  Neck: Supple  Pulmonary/Chest: Lungs are clear to auscultation bilaterally.  Cardiovascular: Heart is regular rate and rhythm. No murmur.  Abdomen: Soft, non-distended. Tender to palpations RUQ. No guarding. No rebound. Positive bowel sounds. No lower abdominal tenderness.  Musculoskeletal: Moving all extremities well. No peripheral edema.   Neurological: Alert. No focal neurologic deficit.   Skin: No rash.    ED Course        Procedures               Critical Care time:  none     Labs Ordered and Resulted from Time of ED Arrival Up to the Time of Departure from the ED   ROUTINE UA WITH MICROSCOPIC - Abnormal; Notable for the following components:       Result Value    pH Urine 8.0 (*)     Mucous Urine Present (*)     Amorphous Crystals Few (*)     All other components within normal limits   COMPREHENSIVE METABOLIC PANEL - Abnormal; Notable for the following components:    Chloride 110 (*)     All other components within normal limits   CBC WITH PLATELETS DIFFERENTIAL   LIPASE   HCG QUALITATIVE URINE               Results for orders placed or performed during the hospital encounter of 04/07/19   CT Abdomen Pelvis w Contrast    Narrative    CT ABDOMEN AND PELVIS WITH CONTRAST 4/7/2019 2:07 PM    HISTORY: Worsening abdominal pain with constipation.    TECHNIQUE: Helical axial scans from dome of liver through pubic  symphysis with 71 mL Isovue 370 IV contrast. Radiation dose for this  scan was reduced using automated exposure control, adjustment of the  mA and/or kV according to patient size, or iterative reconstruction  technique.    COMPARISON: 3/14/2018.    FINDINGS: Prior cholecystectomy again noted. The liver, spleen,  pancreas, bilateral adrenal glands and kidneys bilaterally are  unremarkable and unchanged. The bowel and mesentery in the  upper  abdomen show no significant abnormality. There is a moderate amount of  stool scattered throughout the colon.    Scans through the pelvis show no acute abnormality or free fluid. The  appendix is not definitely identified, but there is no CT evidence for  appendicitis. A previously seen small right adnexal cyst is no longer  identified.        Impression    IMPRESSION: No acute-appearing abnormality in the abdomen or pelvis.    CHAPARRO ONEAL MD         Medications   iopamidol (ISOVUE-370) solution 71 mL (71 mLs Intravenous Given 4/7/19 1332)   sodium chloride 0.9 % bag 500mL for CT scan flush use (58 mLs Intravenous Given 4/7/19 1332)        11:07 AM Patient Assessed.    Assessments & Plan (with Medical Decision Making) records were reviewed.  Labs were obtained.  Patient was given IV fluids.  She was offered pain medication but did not want any.  Comprehensive metabolic panel without significant abnormality.  Patient's white count was not elevated and there was not a left shift.  Urine analysis was unremarkable.  Pregnancy test was negative.  Lipase was 107.  Findings discussed with patient.  Due to her continued symptoms I felt it was warranted to do a CT scan of the abdomen and pelvis.  This was discussed with the patient and she was in agreement with the plan.  CT scan of the abdomen pelvis revealed no obvious acute abnormality other than moderate amount of stool throughout the colon.  There did not appear to be a large amount of stool in the rectal vault or sigmoid colon.  I discussed with her that I did not think an enema would be warranted at this time.  I recommended possible magnesium citrate orally to see if this would help with the constipation and pain.  She understands if symptoms worsen or new symptoms develop she will return for further evaluation and care.     I have reviewed the nursing notes.    I have reviewed the findings, diagnosis, plan and need for follow up with the patient.           Medication List      There are no discharge medications for this visit.         Final diagnoses:   Abdominal pain, right upper quadrant   Constipation, unspecified constipation type     This document serves as a record of the services and decisions personally performed and made by Dave Loyd MD. It was created on HIS/HER behalf by   Beth Ariza, a trained medical scribe. The creation of this document is based the provider's statements to the medical scribe.  Beth Ariza 11:07 AM 4/7/2019    Provider:   The information in this document, created by the medical scribe for me, accurately reflects the services I personally performed and the decisions made by me. I have reviewed and approved this document for accuracy prior to leaving the patient care area.  Dave Loyd MD 11:07 AM 4/7/2019 4/7/2019   [facility] EMERGENCY DEPARTMENT     Dave Loyd MD  04/08/19 4639

## 2019-07-03 ENCOUNTER — HOSPITAL ENCOUNTER (EMERGENCY)
Facility: CLINIC | Age: 34
Discharge: HOME | End: 2019-07-03
Attending: FAMILY MEDICINE | Admitting: FAMILY MEDICINE
Payer: COMMERCIAL

## 2019-07-03 VITALS
TEMPERATURE: 97.9 F | DIASTOLIC BLOOD PRESSURE: 80 MMHG | RESPIRATION RATE: 18 BRPM | OXYGEN SATURATION: 100 % | SYSTOLIC BLOOD PRESSURE: 124 MMHG | WEIGHT: 145 LBS | HEART RATE: 60 BPM | BODY MASS INDEX: 26.52 KG/M2

## 2019-07-03 DIAGNOSIS — F41.9 ANXIETY: ICD-10-CM

## 2019-07-03 LAB
ALBUMIN UR-MCNC: NEGATIVE MG/DL
ANION GAP SERPL CALCULATED.3IONS-SCNC: 6 MMOL/L (ref 3–14)
APPEARANCE UR: CLEAR
BASOPHILS # BLD AUTO: 0 10E9/L (ref 0–0.2)
BASOPHILS NFR BLD AUTO: 0.7 %
BILIRUB UR QL STRIP: NEGATIVE
BUN SERPL-MCNC: 11 MG/DL (ref 7–30)
CALCIUM SERPL-MCNC: 9 MG/DL (ref 8.5–10.1)
CHLORIDE SERPL-SCNC: 108 MMOL/L (ref 94–109)
CO2 SERPL-SCNC: 25 MMOL/L (ref 20–32)
COLOR UR AUTO: ABNORMAL
CREAT SERPL-MCNC: 0.6 MG/DL (ref 0.52–1.04)
DIFFERENTIAL METHOD BLD: NORMAL
EOSINOPHIL # BLD AUTO: 0.2 10E9/L (ref 0–0.7)
EOSINOPHIL NFR BLD AUTO: 2.6 %
ERYTHROCYTE [DISTWIDTH] IN BLOOD BY AUTOMATED COUNT: 12.3 % (ref 10–15)
GFR SERPL CREATININE-BSD FRML MDRD: >90 ML/MIN/{1.73_M2}
GLUCOSE SERPL-MCNC: 76 MG/DL (ref 70–99)
GLUCOSE UR STRIP-MCNC: NEGATIVE MG/DL
HCG UR QL: NEGATIVE
HCT VFR BLD AUTO: 40.8 % (ref 35–47)
HGB BLD-MCNC: 13.3 G/DL (ref 11.7–15.7)
HGB UR QL STRIP: ABNORMAL
IMM GRANULOCYTES # BLD: 0 10E9/L (ref 0–0.4)
IMM GRANULOCYTES NFR BLD: 0.2 %
KETONES UR STRIP-MCNC: 20 MG/DL
LEUKOCYTE ESTERASE UR QL STRIP: NEGATIVE
LYMPHOCYTES # BLD AUTO: 1.8 10E9/L (ref 0.8–5.3)
LYMPHOCYTES NFR BLD AUTO: 31.1 %
MCH RBC QN AUTO: 28.9 PG (ref 26.5–33)
MCHC RBC AUTO-ENTMCNC: 32.6 G/DL (ref 31.5–36.5)
MCV RBC AUTO: 89 FL (ref 78–100)
MONOCYTES # BLD AUTO: 0.4 10E9/L (ref 0–1.3)
MONOCYTES NFR BLD AUTO: 6.5 %
MUCOUS THREADS #/AREA URNS LPF: PRESENT /LPF
NEUTROPHILS # BLD AUTO: 3.4 10E9/L (ref 1.6–8.3)
NEUTROPHILS NFR BLD AUTO: 58.9 %
NITRATE UR QL: NEGATIVE
NRBC # BLD AUTO: 0 10*3/UL
NRBC BLD AUTO-RTO: 0 /100
PH UR STRIP: 6 PH (ref 5–7)
PLATELET # BLD AUTO: 332 10E9/L (ref 150–450)
POTASSIUM SERPL-SCNC: 4 MMOL/L (ref 3.4–5.3)
RBC # BLD AUTO: 4.6 10E12/L (ref 3.8–5.2)
RBC #/AREA URNS AUTO: 0 /HPF (ref 0–2)
SODIUM SERPL-SCNC: 139 MMOL/L (ref 133–144)
SOURCE: ABNORMAL
SP GR UR STRIP: 1.01 (ref 1–1.03)
SQUAMOUS #/AREA URNS AUTO: <1 /HPF (ref 0–1)
TSH SERPL DL<=0.005 MIU/L-ACNC: 1.26 MU/L (ref 0.4–4)
UROBILINOGEN UR STRIP-MCNC: 0 MG/DL (ref 0–2)
WBC # BLD AUTO: 5.7 10E9/L (ref 4–11)
WBC #/AREA URNS AUTO: 1 /HPF (ref 0–5)

## 2019-07-03 PROCEDURE — 84443 ASSAY THYROID STIM HORMONE: CPT | Performed by: FAMILY MEDICINE

## 2019-07-03 PROCEDURE — 81025 URINE PREGNANCY TEST: CPT | Performed by: FAMILY MEDICINE

## 2019-07-03 PROCEDURE — 25800030 ZZH RX IP 258 OP 636: Performed by: FAMILY MEDICINE

## 2019-07-03 PROCEDURE — 25000128 H RX IP 250 OP 636: Performed by: FAMILY MEDICINE

## 2019-07-03 PROCEDURE — 85025 COMPLETE CBC W/AUTO DIFF WBC: CPT | Performed by: FAMILY MEDICINE

## 2019-07-03 PROCEDURE — 96375 TX/PRO/DX INJ NEW DRUG ADDON: CPT | Performed by: FAMILY MEDICINE

## 2019-07-03 PROCEDURE — 99284 EMERGENCY DEPT VISIT MOD MDM: CPT | Mod: 25 | Performed by: FAMILY MEDICINE

## 2019-07-03 PROCEDURE — 96361 HYDRATE IV INFUSION ADD-ON: CPT | Performed by: FAMILY MEDICINE

## 2019-07-03 PROCEDURE — 96374 THER/PROPH/DIAG INJ IV PUSH: CPT | Performed by: FAMILY MEDICINE

## 2019-07-03 PROCEDURE — 93010 ELECTROCARDIOGRAM REPORT: CPT | Mod: Z6 | Performed by: FAMILY MEDICINE

## 2019-07-03 PROCEDURE — 81001 URINALYSIS AUTO W/SCOPE: CPT | Performed by: FAMILY MEDICINE

## 2019-07-03 PROCEDURE — 93005 ELECTROCARDIOGRAM TRACING: CPT | Performed by: FAMILY MEDICINE

## 2019-07-03 PROCEDURE — 80048 BASIC METABOLIC PNL TOTAL CA: CPT | Performed by: FAMILY MEDICINE

## 2019-07-03 RX ORDER — ERGOCALCIFEROL 1.25 MG/1
50000 CAPSULE, LIQUID FILLED ORAL WEEKLY
COMMUNITY

## 2019-07-03 RX ORDER — DIPHENHYDRAMINE HYDROCHLORIDE 50 MG/ML
25 INJECTION INTRAMUSCULAR; INTRAVENOUS ONCE
Status: COMPLETED | OUTPATIENT
Start: 2019-07-03 | End: 2019-07-03

## 2019-07-03 RX ORDER — ONDANSETRON 2 MG/ML
8 INJECTION INTRAMUSCULAR; INTRAVENOUS
Status: DISCONTINUED | OUTPATIENT
Start: 2019-07-03 | End: 2019-07-03 | Stop reason: HOSPADM

## 2019-07-03 RX ORDER — MELATONIN 5 MG
5 TABLET,CHEWABLE ORAL
COMMUNITY

## 2019-07-03 RX ORDER — PROCHLORPERAZINE MALEATE 10 MG
5-10 TABLET ORAL EVERY 6 HOURS PRN
COMMUNITY

## 2019-07-03 RX ORDER — ALBUTEROL SULFATE 90 UG/1
1-2 AEROSOL, METERED RESPIRATORY (INHALATION) EVERY 4 HOURS PRN
COMMUNITY
End: 2019-12-30

## 2019-07-03 RX ORDER — KETOROLAC TROMETHAMINE 15 MG/ML
15 INJECTION, SOLUTION INTRAMUSCULAR; INTRAVENOUS ONCE
Status: COMPLETED | OUTPATIENT
Start: 2019-07-03 | End: 2019-07-03

## 2019-07-03 RX ADMIN — KETOROLAC TROMETHAMINE 15 MG: 15 INJECTION, SOLUTION INTRAMUSCULAR; INTRAVENOUS at 15:25

## 2019-07-03 RX ADMIN — DIPHENHYDRAMINE HYDROCHLORIDE 25 MG: 50 INJECTION, SOLUTION INTRAMUSCULAR; INTRAVENOUS at 15:25

## 2019-07-03 RX ADMIN — ONDANSETRON 8 MG: 2 INJECTION INTRAMUSCULAR; INTRAVENOUS at 15:24

## 2019-07-03 RX ADMIN — SODIUM CHLORIDE, POTASSIUM CHLORIDE, SODIUM LACTATE AND CALCIUM CHLORIDE 1000 ML: 600; 310; 30; 20 INJECTION, SOLUTION INTRAVENOUS at 15:24

## 2019-07-03 NOTE — ED AVS SNAPSHOT
[facility] Emergency Department  [address] [address]  Phone:  [phone]  Fax:  [phone]                                    Melina Caicedo   MRN: [id number]    Department:  [facility] Emergency Department   Date of Visit:  7/3/2019           After Visit Summary Signature Page    I have received my discharge instructions, and my questions have been answered. I have discussed any challenges I see with this plan with the nurse or doctor.    ..........................................................................................................................................  Patient/Patient Representative Signature      ..........................................................................................................................................  Patient Representative Print Name and Relationship to Patient    ..................................................               ................................................  Date                                   Time    ..........................................................................................................................................  Reviewed by Signature/Title    ...................................................              ..............................................  Date                                               Time          22EPIC Rev 08/18

## 2019-07-03 NOTE — DISCHARGE INSTRUCTIONS
Return to the Emergency Room if the following occurs:     Severely worsened breathing, recurrent fainting, new chest pain, fever >101, or for any concern at anytime.    Or, follow-up with the following provider as we discussed:     Return to your primary doctor next week ideally for reevaluation.    Medications discussed:    None new.  No changes.    If you received pain-relieving or sedating medication during your time in the ER, avoid alcohol, driving automobiles, or working with machinery.  Also, a responsible adult must stay with you.        Call the Nurse Advice Line at [phone] or [phone] for any concern at anytime.

## 2019-07-03 NOTE — ED NOTES
Shakiness, tingling in hands that started approximately 1 hour ago.  Patient had a headache and vomited x1.  Took Ibuprofen and headache went away.  Patient denies nausea now.  Patient has a small meningioma but patient stated that neurologist told her that it shouldn't be causing any problem.  Patient had breakfast but not lunch.  Patient stated that she has drank 1 bottle of water today.   Uvaldo Gan RN on 7/3/2019 at 2:36 PM

## 2019-07-03 NOTE — ED PROVIDER NOTES
HPI  Current medications, past medical history, and social history are reviewed.    The patient is a 33-year-old female presenting with multiple symptoms and concerns.  She has a known history of regular headaches which she defines as migraine.  She does not take migraine specific medication on a regular basis.  She has a known history of anxiety and panic.  She denies alcohol and drugs of abuse.  She does not smoke.    Patient awoke this morning with a headache that came on slowly on the left side.  She was concerned that it might be another migraine.  She took some ibuprofen.  She described having some blurred vision or eye strain bilaterally which is not unusual.  She has mild light sensitivity.  She is not nauseous at the time.  Her headache improved while at work.  After lunch she threw up as she began to feel numbness and tingling in her arms bilaterally.  On the way home with her boyfriend she began to feel more anxious.  She does not feel right and she tells me that her symptoms are different than her usual panic attack.  She denies having a fever.  She does have some occasional palpitations.  She denies having chest pain or shortness of breath.  No dysuria, urgency, or frequency.  No vaginal discharge or irritation on the ordinary.    ROS: All other review of systems are negative other than that noted above.     Past Medical History:   Diagnosis Date     Anxiety      Asthma      Bipolar affective (H)      Depressive disorder      Past Surgical History:   Procedure Laterality Date     CHOLECYSTECTOMY  6/2011     ENDOSCOPY UPPER WITH PANCREATIC STIMULATION       Medicines    albuterol (PROAIR HFA/PROVENTIL HFA/VENTOLIN HFA) 108 (90 Base) MCG/ACT inhaler   fluticasone-salmeterol (AIRDUO RESPICLICK 232/14) 232-14 MCG/ACT inhaler   magnesium 250 MG tablet   Melatonin 5 MG CHEW   nicotine polacrilex (COMMIT) 2 MG lozenge   Riboflavin (VITAMIN B-2 PO)   venlafaxine (EFFEXOR-XR) 37.5 MG 24 hr capsule   vitamin D2  (ERGOCALCIFEROL) 08652 units (1250 mcg) capsule   prochlorperazine (COMPAZINE) 10 MG tablet     No family history on file.  Social History     Tobacco Use     Smoking status: Never Smoker     Smokeless tobacco: Never Used   Substance Use Topics     Alcohol use: No     Drug use: No         PHYSICAL  /80   Pulse 60   Temp 97.9  F (36.6  C) (Oral)   Resp 18   Wt 65.8 kg (145 lb)   SpO2 100%   BMI 26.52 kg/m    General: Patient is alert and in moderate distress.  Anxious.  Neurological: Alert.  Moving upper and lower extremities equally, bilaterally.  Head / Neck: Atraumatic.  Ears: Not done.  Eyes: Pupils are equal, round, and reactive.  Normal conjunctiva.  Nose: Midline.  No epistaxis.  Mouth / Throat: No ulcerations or lesions.  Upper pharynx is not erythematous.  Moist.  Respiratory: No respiratory distress. CTA B.  Cardiovascular: Regular rhythm.  Peripheral extremities are warm.  No edema.  No calf tenderness.  Abdomen / Pelvis: Not tender.  No distention.  Soft throughout.  Genitalia: Not done.  Musculoskeletal: No tenderness over major muscles and joints.  Skin: No evidence of rash or trauma.        ED COURSE  1504.  The patient has multiple symptoms and concerns.  I do not know what the cause of her headache this morning was, perhaps related to her usual headache or migraine.  I do not know why she threw up after lunch.  I suspect her tingling and lightheadedness and palpitations are related to her anxiety and some hyperventilation.  Zofran, Toradol, Benadryl ordered.  Lab values pending.  Fluid bolus.  EKG because of palpitations and her pulse of 101.    1654.  The patient is feeling better after medications given.  Her shakiness is gone.  Her work appears unremarkable.  Low concern for severe underlying pathology causing her symptoms.  Perhaps anxiety?  No emergent need for hospitalization or consultation.  Follow-up discussed.  No medication changes at this time.    Labs Ordered and Resulted  from Time of ED Arrival Up to the Time of Departure from the ED   ROUTINE UA WITH MICROSCOPIC REFLEX TO CULTURE - Abnormal; Notable for the following components:       Result Value    Ketones Urine 20 (*)     Blood Urine Small (*)     Mucous Urine Present (*)     All other components within normal limits   CBC WITH PLATELETS DIFFERENTIAL   BASIC METABOLIC PANEL   TSH WITH FREE T4 REFLEX   HCG QUALITATIVE URINE     EKG  (1515)   Interpretation performed by me.  Rate: 71     Rhythm: sinus     Axis: nl  Intervals: KS (12-2) 148, QRS (<12) 88, QTc (>5) 407  P wave: nl     QRS complex: nl  ST segment / T-wave: nl  Conclusion: nl    Medications   ondansetron (ZOFRAN) injection 8 mg (8 mg Intravenous Given 7/3/19 1524)   diphenhydrAMINE (BENADRYL) injection 25 mg (25 mg Intravenous Given 7/3/19 1525)   lactated ringers BOLUS 1,000 mL (1,000 mLs Intravenous New Bag 7/3/19 1524)   ketorolac (TORADOL) injection 15 mg (15 mg Intravenous Given 7/3/19 1525)         IMPRESSION    ICD-10-CM    1. Anxiety F41.9                                  Albino Lynn MD  07/03/19 2180

## 2019-07-09 PROBLEM — M54.2 NECK PAIN: Status: RESOLVED | Noted: 2018-06-25 | Resolved: 2019-07-09

## 2019-07-09 NOTE — PROGRESS NOTES
DISCHARGE SUMMARY    Melina Caicedo was seen 4 times for evaluation and treatment.  Patient did not return for further treatment and current status is unknown.  Due to short treatment duration, no objective or functional changes were made.  Please see goal flow sheet from episode noted date below and initial evaluation for further information.  Patient is discharged from therapy and therapy episode is resolved as of 07/09/19.      Linked Episodes   Type: Episode: Status: Noted: Resolved: Last update: Updated by:   PHYSICAL THERAPY Neck pain 6/25/18 Active 6/25/2018 7/26/2018 11:55 AM Patrick Merlos, PT      Comments:

## 2019-12-30 ENCOUNTER — APPOINTMENT (OUTPATIENT)
Dept: GENERAL RADIOLOGY | Facility: CLINIC | Age: 34
End: 2019-12-30
Attending: NURSE PRACTITIONER
Payer: COMMERCIAL

## 2019-12-30 ENCOUNTER — HOSPITAL ENCOUNTER (EMERGENCY)
Facility: CLINIC | Age: 34
Discharge: HOME | End: 2019-12-30
Attending: NURSE PRACTITIONER | Admitting: NURSE PRACTITIONER
Payer: COMMERCIAL

## 2019-12-30 VITALS
TEMPERATURE: 98.9 F | OXYGEN SATURATION: 98 % | DIASTOLIC BLOOD PRESSURE: 75 MMHG | WEIGHT: 150 LBS | RESPIRATION RATE: 18 BRPM | SYSTOLIC BLOOD PRESSURE: 128 MMHG | BODY MASS INDEX: 27.44 KG/M2

## 2019-12-30 DIAGNOSIS — J06.9 VIRAL URI WITH COUGH: ICD-10-CM

## 2019-12-30 DIAGNOSIS — J45.901 ASTHMA EXACERBATION: ICD-10-CM

## 2019-12-30 PROCEDURE — G0463 HOSPITAL OUTPT CLINIC VISIT: HCPCS | Mod: 25

## 2019-12-30 PROCEDURE — 71046 X-RAY EXAM CHEST 2 VIEWS: CPT

## 2019-12-30 PROCEDURE — 99214 OFFICE O/P EST MOD 30 MIN: CPT | Mod: Z6 | Performed by: NURSE PRACTITIONER

## 2019-12-30 RX ORDER — ALBUTEROL SULFATE 90 UG/1
1-2 AEROSOL, METERED RESPIRATORY (INHALATION) EVERY 4 HOURS PRN
Qty: 6.7 G | Refills: 0 | Status: SHIPPED | OUTPATIENT
Start: 2019-12-30

## 2019-12-30 RX ORDER — PREDNISONE 20 MG/1
TABLET ORAL
Qty: 10 TABLET | Refills: 0 | Status: SHIPPED | OUTPATIENT
Start: 2019-12-30 | End: 2021-07-19 | Stop reason: DRUGHIGH

## 2019-12-30 ASSESSMENT — ENCOUNTER SYMPTOMS
RHINORRHEA: 1
COUGH: 1
SHORTNESS OF BREATH: 0
WHEEZING: 1
SINUS PRESSURE: 1
FEVER: 1
VOMITING: 0
HEADACHES: 1
SORE THROAT: 1
NAUSEA: 0
CHILLS: 1

## 2019-12-30 NOTE — ED AVS SNAPSHOT
[facility] Emergency Department  [address] [address]  Phone:  [phone]  Fax:  [phone]                                    Melina Caicedo   MRN: [id number]    Department:  [facility] Emergency Department   Date of Visit:  12/30/2019           After Visit Summary Signature Page    I have received my discharge instructions, and my questions have been answered. I have discussed any challenges I see with this plan with the nurse or doctor.    ..........................................................................................................................................  Patient/Patient Representative Signature      ..........................................................................................................................................  Patient Representative Print Name and Relationship to Patient    ..................................................               ................................................  Date                                   Time    ..........................................................................................................................................  Reviewed by Signature/Title    ...................................................              ..............................................  Date                                               Time          22EPIC Rev 08/18

## 2019-12-30 NOTE — LETTER
December 30, 2019      To Whom It May Concern:      Melina BARRAZA Marifer was seen in our Urgent Care today, 12/30/2019.  Please excuse her from work due to this illness.      Sincerely,        HERBERT Patricia CNP

## 2019-12-30 NOTE — DISCHARGE INSTRUCTIONS
Prednisone 40 mg daily for 5 days.  Refill of albuterol inhaler was sent.  Stay well-hydrated.  Return for high fevers, chest pain, increased work of breathing, or getting worse in any way.

## 2019-12-31 NOTE — ED PROVIDER NOTES
History     Chief Complaint   Patient presents with     Cough     symptoms started 1 month ago.  Last 3 days started with fever, chills, body aches and cough and sinus congestion     HPI  Melina Caicedo is a 34 year old female with history of asthma who presents urgent care for evaluation of cough, congestion, fever, and body aches.  Symptoms started 1 month ago, but worse in the last 3 days.  Increased use of her albuterol inhaler the last few days.  Denies chest pain or shortness of breath.    Allergies:  Allergies   Allergen Reactions     Citalopram Other (See Comments)     Seizure     Depakote Other (See Comments)     Headache       Fluticasone Other (See Comments)     Nosebleed and headache     Serotonin Reuptake Inhibitors Other (See Comments)     seizure     Valproic Acid Other (See Comments)     Headache       Problem List:    Patient Active Problem List    Diagnosis Date Noted     Major depressive disorder 10/21/2016     Priority: Medium     Overview:   Major depression especially post partum.       Hematuria 10/10/2013     Priority: Medium     Heart murmur 09/12/2013     Priority: Medium     Overview:   Auscultated on 9/12/13.  No history of murmur according to patient and chart.       Persistent asthma 11/09/2012     Priority: Medium     Anxiety 02/01/2007     Priority: Medium        Past Medical History:    Past Medical History:   Diagnosis Date     Anxiety      Asthma      Bipolar affective (H)      Depressive disorder        Past Surgical History:    Past Surgical History:   Procedure Laterality Date     CHOLECYSTECTOMY  6/2011     ENDOSCOPY UPPER WITH PANCREATIC STIMULATION         Family History:    No family history on file.    Social History:  Marital Status:  Single [1]  Social History     Tobacco Use     Smoking status: Never Smoker     Smokeless tobacco: Never Used   Substance Use Topics     Alcohol use: No     Drug use: No        Medications:    albuterol (PROAIR HFA/PROVENTIL  "ED Provider Note    Scribed for Kait Israel M.D. by Nitin Clark. 7/24/2019, 11:53 AM.    Primary care provider: JENNY Jay  Means of arrival: walk-in  History obtained from: patient  History limited by: none    CHIEF COMPLAINT  Chief Complaint   Patient presents with   • Sinus Pain     congestion.  onset of symptoms 3 weeks ago.  no relief with OTC medications.  reports unknown fever       HPI  Jumana Kahn is a 53 y.o. female who presents to the Emergency Department with complaints of sinus pain gradual onset 3 weeks ago. She attempted sinus rinses 2-3 times without much alleviation. She notes associated yellow/bloody nasal discharge. She has a chronic history of sinus infections. Denies any fever.     REVIEW OF SYSTEMS  Pertinent positives include sinus pain, yellow/bloody nasal discharge. Pertinent negatives include no fever.   See HPI for further details.     PAST MEDICAL HISTORY   has a past medical history of Hypothyroid and Psychiatric disorder.    SURGICAL HISTORY  Past Surgical History:   Procedure Laterality Date   • GYN SURGERY      tubal ligation   • OTHER      sinus surgery   • OTHER ORTHOPEDIC SURGERY      leg       SOCIAL HISTORY  Social History   Substance Use Topics   • Smoking status: Former Smoker     Quit date: 7/24/2001   • Smokeless tobacco: Never Used   • Alcohol use Yes      Comment: \"sometimes\"      History   Drug Use No       FAMILY HISTORY  History reviewed. No pertinent family history.    CURRENT MEDICATIONS  Home Medications     Reviewed by Tiffany Peterson R.N. (Registered Nurse) on 07/24/19 at 1130  Med List Status: Partial   Medication Last Dose Status   ibuprofen (MOTRIN) 800 MG Tab  Active   levothyroxine (SYNTHROID) 50 MCG Tab 7/24/2019 Active                ALLERGIES  Allergies   Allergen Reactions   • Amoxicillin      Patient states it does not work   • Ampicillin Rash     Skin rash       PHYSICAL EXAM  VITAL SIGNS: /88   Pulse 98   Temp 36.4 " "°C (97.5 °F) (Temporal)   Resp 16   Ht 1.651 m (5' 5\")   Wt 76.3 kg (168 lb 3.4 oz)   SpO2 95%   BMI 27.99 kg/m²   Vitals reviewed.    Consitutional: Well-developed, well-nourished. Negative for: distress.  HENT: Normocephalic, atraumatic, Bilateral turbinates and mucosa are edematous and hyperemic. Thick white discharge especially from the right side. Right external ear normal, left external ear normal.  Eyes: Conjunctivae normal, negative for left and right eye discharge.  Neck: Range of motion normal, supple.   Musculoskeletal: Normal range of motion.  Neurological: Alert and oriented x3.  Skin: Warm, dry. Negative for: erythema, rash.  Psych: Mood/affect normal, behavior normal.    COURSE & MEDICAL DECISION MAKING  Nursing notes, VS, PMSFHx reviewed in chart.    11:53 AM - Patient seen and examined at bedside. The patient presents with sinus congestion and pain and the differential diagnosis includes but is not limited to viral versus bacterial sinusitis she has had symptoms for over 3 weeks, with a history of surgeries, so more likely bacterial. Discussed with the patient that she will be discharged with a prescription for Azithromycin and Nasonex. Patient verbalizes understanding and agreement to this plan for discharge.      The patient will return for new or worsening symptoms and is stable at the time of discharge.    The patient is referred to a primary physician for blood pressure management, diabetic screening, and for all other preventative health concerns.    DISPOSITION:  Patient will be discharged home in stable condition.    FOLLOW UP:  Megan Arevalo, A.P.N.  [address]  [address] [id number] [phone]    Schedule an appointment as soon as possible for a visit         OUTPATIENT MEDICATIONS:  Discharge Medication List as of 7/24/2019 12:09 PM      START taking these medications    Details   azithromycin (ZITHROMAX) 250 MG Tab Take two tabs by mouth on day one, then one tab by " HFA/VENTOLIN HFA) 108 (90 Base) MCG/ACT inhaler  predniSONE (DELTASONE) 20 MG tablet  fluticasone-salmeterol (AIRDUO RESPICLICK 232/14) 232-14 MCG/ACT inhaler  magnesium 250 MG tablet  Melatonin 5 MG CHEW  nicotine polacrilex (COMMIT) 2 MG lozenge  prochlorperazine (COMPAZINE) 10 MG tablet  Riboflavin (VITAMIN B-2 PO)  venlafaxine (EFFEXOR-XR) 37.5 MG 24 hr capsule  vitamin D2 (ERGOCALCIFEROL) 82979 units (1250 mcg) capsule          Review of Systems   Constitutional: Positive for chills and fever.   HENT: Positive for congestion, rhinorrhea, sinus pressure and sore throat.    Respiratory: Positive for cough and wheezing. Negative for shortness of breath.    Gastrointestinal: Negative for nausea and vomiting.   Neurological: Positive for headaches.       Physical Exam   BP: 128/75  Heart Rate: 78  Temp: 98.9  F (37.2  C)  Resp: 18  Weight: 68 kg (150 lb)  SpO2: 98 %      Physical Exam    GENERAL APPEARANCE: alert and oriented. NAD.   EYES: conjunctiva clear  HENT: bilateral ear canals clear, intact, and without inflammation. Right TM normal. Left TM normal. nasal congestion.  Oropharynx without ulcers, erythema or lesions  NECK: supple, nontender, no lymphadenopathy  RESP: Expiratory wheezing throughout.  No tachypnea.  Speaking full sentences.  CV: regular rates and rhythm, normal S1 S2, no murmur noted    ED Course        Procedures               Results for orders placed or performed during the hospital encounter of 12/30/19 (from the past 24 hour(s))   XR Chest 2 Views    Narrative    CHEST TWO VIEWS  12/30/2019 2:01 PM     HISTORY: 34-year-old woman with history of cough.       Impression    IMPRESSION: Since December 14, 2016, heart size is normal. No pleural  effusion, pneumothorax, or abnormal area of consolidation.    ZACKERY ELLER MD       Medications - No data to display    Assessments & Plan (with Medical Decision Making)     History and exam is consistent with a viral URI with cough causing  exacerbation of her asthma.  Lung sounds with expiratory wheezing throughout.  Chest x-ray is negative for infiltrate or consolidation to suggest pneumonia.  Patient is normotensive.  Afebrile here.  No tachycardia.  No hypoxia.  No tachypnea or increased work of breathing.  Plan as follows:    Prednisone 40 mg daily for 5 days.  Refill of albuterol inhaler was sent.  Stay well-hydrated.  Return for high fevers, chest pain, increased work of breathing, or getting worse in any way.    I have reviewed the nursing notes.    I have reviewed the findings, diagnosis, plan and need for follow up with the patient.      Discharge Medication List as of 12/30/2019  2:21 PM      START taking these medications    Details   predniSONE (DELTASONE) 20 MG tablet Take two tablets (= 40mg) each day for 5 (five) days, Disp-10 tablet, R-0, E-Prescribe             Final diagnoses:   Asthma exacerbation   Viral URI with cough       12/30/2019   [facility] EMERGENCY DEPARTMENT     Lindsey, HERBERT Bond CNP  12/30/19 1958     mouth daily on days 2-5., Disp-6 Tab, R-0, Normal      mometasone (NASONEX) 50 MCG/ACT nasal spray Spray 2 Sprays in nose every day., Disp-1 Inhaler, R-0, Normal               FINAL IMPRESSION  1. Acute recurrent maxillary sinusitis          Nitin SALGADO (Scribe), am scribing for, and in the presence of, Kait Israel M.D..    Electronically signed by: Nitin Clark (Scribe), 7/24/2019    IKait M.D. personally performed the services described in this documentation, as scribed by Nitin Clark in my presence, and it is both accurate and complete. E     The note accurately reflects work and decisions made by me.  Kait Israel  7/24/2019  2:13 PM

## 2020-06-10 ENCOUNTER — HOSPITAL ENCOUNTER (EMERGENCY)
Facility: CLINIC | Age: 35
Discharge: HOME | End: 2020-06-10
Attending: PHYSICIAN ASSISTANT | Admitting: PHYSICIAN ASSISTANT
Payer: COMMERCIAL

## 2020-06-10 ENCOUNTER — NURSE TRIAGE (OUTPATIENT)
Dept: NURSING | Facility: CLINIC | Age: 35
End: 2020-06-10

## 2020-06-10 VITALS
OXYGEN SATURATION: 100 % | BODY MASS INDEX: 29.44 KG/M2 | HEART RATE: 71 BPM | RESPIRATION RATE: 16 BRPM | TEMPERATURE: 98.6 F | HEIGHT: 62 IN | WEIGHT: 160 LBS | DIASTOLIC BLOOD PRESSURE: 87 MMHG | SYSTOLIC BLOOD PRESSURE: 133 MMHG

## 2020-06-10 DIAGNOSIS — H11.421 CHEMOSIS OF CONJUNCTIVA, RIGHT: ICD-10-CM

## 2020-06-10 PROCEDURE — 99283 EMERGENCY DEPT VISIT LOW MDM: CPT | Performed by: PHYSICIAN ASSISTANT

## 2020-06-10 PROCEDURE — 99284 EMERGENCY DEPT VISIT MOD MDM: CPT | Mod: Z6 | Performed by: PHYSICIAN ASSISTANT

## 2020-06-10 RX ORDER — OLOPATADINE HYDROCHLORIDE 1 MG/ML
1 SOLUTION/ DROPS OPHTHALMIC 2 TIMES DAILY
Qty: 10 ML | Refills: 0 | Status: SHIPPED | OUTPATIENT
Start: 2020-06-10 | End: 2020-07-10

## 2020-06-10 ASSESSMENT — ENCOUNTER SYMPTOMS
SINUS PAIN: 0
FEVER: 0
SORE THROAT: 0
CHILLS: 0
COLOR CHANGE: 0
WHEEZING: 0
COUGH: 0
NAUSEA: 0
WOUND: 0
VOMITING: 0
RHINORRHEA: 0
SHORTNESS OF BREATH: 0
EYE REDNESS: 1
DIARRHEA: 0
DIZZINESS: 0
ABDOMINAL PAIN: 0
PHOTOPHOBIA: 0
HEADACHES: 0

## 2020-06-10 ASSESSMENT — MIFFLIN-ST. JEOR: SCORE: 1384.01

## 2020-06-10 NOTE — ED AVS SNAPSHOT
[facility] Emergency Department  [address] [address]  Phone:  [phone]  Fax:  [phone]                                    Melina Caicedo   MRN: [id number]    Department:  [facility] Emergency Department   Date of Visit:  6/10/2020           After Visit Summary Signature Page    I have received my discharge instructions, and my questions have been answered. I have discussed any challenges I see with this plan with the nurse or doctor.    ..........................................................................................................................................  Patient/Patient Representative Signature      ..........................................................................................................................................  Patient Representative Print Name and Relationship to Patient    ..................................................               ................................................  Date                                   Time    ..........................................................................................................................................  Reviewed by Signature/Title    ...................................................              ..............................................  Date                                               Time          22EPIC Rev 08/18

## 2020-06-11 NOTE — TELEPHONE ENCOUNTER
"PCP  Casimiro: Cheryl SANTIAGO    Patient states she has had a small brain tumor x 3 yrs. She says that today her right eye is \"screwed up\": \"it looks like there is a pocket of fluid, bulging on right side of my right eye. It looks like jelly. It is raised by the colored part of the eye\". In the last 4 months she has noticed that the right side of her face is larger than the left. She will be seeing her neurologist for this.   She had sx a couple of weeks ago as if there were fluid filling half of her vision.  Her vision is currently blurry out of the right eye and she cannot see out of her peripheral vision. Patient states that it swelled up in the last 1 hr. The eyelid is also swollen. Her eye is\" tearing: pooling up.  It hurts. Pain=\"4\".  A lot of pressure. The right eye is red\". No fever noted.   Triaged to a disposition of see provider within 4 hrs.   Hospital: [place]. She intends to go to the ER now.    Additional Information    Negative: Unresponsive, passed out or very weak    Negative: Difficulty breathing or wheezing    Negative: [1] Difficulty swallowing or slurred speech AND [2] sudden onset    Negative: Sounds like a life-threatening emergency to the triager    Negative: Recent injury to the eye    Negative: Entire face is swollen    Negative: Sacs of clear fluid (blisters) on whites of eyes (allergic cysts)    Negative: Contact with pollen, other allergic substance or eyedrops    Negative: [1] Bee sting AND [2] within last 24 hours    Negative: Insect bite suspected    Negative: Sty suspected (small, painful red lump present on lid margin    Negative: Yellow or green discharge (pus) in the eye    Redness of white area (sclera) of eye(s)    Negative: Chemical got in the eye    Negative: Piece of something got in the eye    Negative: Eye redness followed an eye injury    Negative: Yellow or green pus in the eyes    Negative: [1] Eyelid is swollen AND [2] no redness of white of eye (sclera)    Negative: " "Caused by pollen or other allergy OR the main symptom is itchy eyes    Negative: Red, widespread rash also present    Negative: SEVERE eye pain    Negative: [1] Eyelids are very swollen (shut or almost) AND [2] fever    Negative: [1] Eyelid (outer) is very red (or tender to touch) AND [2] fever    Negative: [1] Foreign body sensation (\"feels like something is in there\") AND [2] irrigation didn't help    Negative: Vomiting    Negative: [1] Recent eye surgery AND [2] increasing eye pain    Negative: Patient sounds very sick or weak to the triager    Blurred vision    [1] Eye pain AND [2] more than mild    Protocols used: EYE - SWELLING-A-AH, EYE - RED WITHOUT PUS-A-AH  COVID 19 Nurse Triage Plan/Patient Instructions    Please be aware that novel coronavirus (COVID-19) may be circulating in the community. If you develop symptoms such as fever, cough, or SOB or if you have concerns about the presence of another infection including coronavirus (COVID-19), please contact your health care provider or visit [email or web address].     Disposition/Instructions    Patient to go to ED and follow protocol based instructions.     Bring Your Own Device:  Please also bring your smart device(s) (smart phones, tablets, laptops) and their charging cables for your personal use and to communicate with your care team during your visit.    Thank you for taking steps to prevent the spread of this virus.  o Limit your contact with others.  o Wear a simple mask to cover your cough.  o Wash your hands well and often.    Resources    [facility]: About COVID-19: [email or web address]    CDC: What to Do If You're Sick: www.cdc.gov/coronavirus/2019-ncov/about/steps-when-sick.html    CDC: Ending Home Isolation: www.cdc.gov/coronavirus/2019-ncov/hcp/disposition-in-home-patients.html     CDC: Caring for Someone: www.cdc.gov/coronavirus/2019-ncov/if-you-are-sick/care-for-someone.html     MD: Interim Guidance for Hospital Discharge to " Home: www.health.[facility].mn./diseases/coronavirus/hcp/hospdischarge.pdf    [facility] clinical trials (COVID-19 research studies): [email or web address].[email or web address]/umn-clinical-trials     Below are the COVID-19 hotlines at the [place] Department [facility] ([facility]). Interpreters are available.   o For health questions: Call 800-485-3539 or 1-211.747.5558 (7 a.m. to 7 p.m.)  o For questions about schools and childcare: Call 040-788-5719 or 1-151.697.8540 (7 a.m. to 7 p.m.)

## 2020-06-11 NOTE — ED PROVIDER NOTES
History     Chief Complaint   Patient presents with     Eye Problem     swelling on conjunctivitis, started about 9pm tonight     PETE  Melina Caicedo is a 34 year old female who presents to the emergency department with concern over right eye pruritus, swelling which began approximately 9 PM this evening, 90 minutes prior to arrival.  Patient denied any instigating injury or trauma.  No concern for foreign body in her eye.  She does complain of pruritus, watery discharge, swelling on the surface of the eye concern for intermittent blurred vision that improves when fluid is removed.   She has minimal pain with extraocular movement.  She denies any purulent discharge, photophobia.  She is not a contact lens user.   She denies any recent changes in soaps, lotions, eye make-up.  She has not attempted any OTC treatments instead presenting directly to the emergency department.  She states concerned that she does have a calcified tumor in her brain, review of epic records indicate history of meningioma and has had borderline elevated pressure in her right eye.  She also reports that she has chronic floaters and did have a visual field deficit in the lower portion of her eye that lasted approximately 20 minutes two weeks ago. She contacted her neurologist and does have a virtual visit to discuss repeat MRI within the next week.    Allergies:  Allergies   Allergen Reactions     Citalopram Other (See Comments)     Seizure     Depakote Other (See Comments)     Headache       Fluticasone Other (See Comments)     Nosebleed and headache     Serotonin Reuptake Inhibitors Other (See Comments)     seizure     Valproic Acid Other (See Comments)     Headache     Problem List:    Patient Active Problem List    Diagnosis Date Noted     Major depressive disorder 10/21/2016     Priority: Medium     Overview:   Major depression especially post partum.       Hematuria 10/10/2013     Priority: Medium     Heart murmur 09/12/2013      "Priority: Medium     Overview:   Auscultated on 9/12/13.  No history of murmur according to patient and chart.       Persistent asthma 11/09/2012     Priority: Medium     Anxiety 02/01/2007     Priority: Medium      Past Medical History:    Past Medical History:   Diagnosis Date     Anxiety      Asthma      Bipolar affective (H)      Depressive disorder        Past Surgical History:    Past Surgical History:   Procedure Laterality Date     CHOLECYSTECTOMY  6/2011     ENDOSCOPY UPPER WITH PANCREATIC STIMULATION       Family History:    No family history on file.    Social History:  Marital Status:  Single [1]  Social History     Tobacco Use     Smoking status: Never Smoker     Smokeless tobacco: Never Used   Substance Use Topics     Alcohol use: No     Drug use: No      Medications:    olopatadine (PATANOL) 0.1 % ophthalmic solution  albuterol (PROAIR HFA/PROVENTIL HFA/VENTOLIN HFA) 108 (90 Base) MCG/ACT inhaler  fluticasone-salmeterol (AIRDUO RESPICLICK 232/14) 232-14 MCG/ACT inhaler  magnesium 250 MG tablet  Melatonin 5 MG CHEW  nicotine polacrilex (COMMIT) 2 MG lozenge  predniSONE (DELTASONE) 20 MG tablet  prochlorperazine (COMPAZINE) 10 MG tablet  Riboflavin (VITAMIN B-2 PO)  venlafaxine (EFFEXOR-XR) 37.5 MG 24 hr capsule  vitamin D2 (ERGOCALCIFEROL) 39408 units (1250 mcg) capsule      Review of Systems   Constitutional: Negative for chills and fever.   HENT: Negative for congestion, ear pain, rhinorrhea, sinus pain and sore throat.    Eyes: Positive for redness. Negative for photophobia.   Respiratory: Negative for cough, shortness of breath and wheezing.    Gastrointestinal: Negative for abdominal pain, diarrhea, nausea and vomiting.   Skin: Negative for color change, rash and wound.   Neurological: Negative for dizziness and headaches.     Physical Exam   BP: 133/87  Pulse: 71  Temp: 98.6  F (37  C)  Resp: 16  Height: 157.5 cm (5' 2\")  Weight: 72.6 kg (160 lb)(stated)  SpO2: 100 %  Physical Exam  GENERAL " APPEARANCE: healthy, alert and no distress  EYES: EOMI,  PERRL, watery discharge present right conjunctiva is minimally injected, diffuse chemosis of the right eye, no observable photophobia, no fluorescein uptake in the right eye   HENT: ear canals and TM's normal.  Nose and mouth without ulcers, erythema or lesions  NECK: supple, nontender, no lymphadenopathy  SKIN: no suspicious lesions or rashes  ED Course        Procedures        Critical Care time:  none           Visual acuity 20/25 right eye 20/15 in left, corrected with glasses  Ocular pressure right eye 23    No results found for this or any previous visit (from the past 24 hour(s)).    Medications - No data to display    Assessments & Plan (with Medical Decision Making)     I have reviewed the nursing notes.  I have reviewed the findings, diagnosis, plan and need for follow up with the patient.       New Prescriptions    OLOPATADINE (PATANOL) 0.1 % OPHTHALMIC SOLUTION    Apply 1 drop to eye 2 times daily     Final diagnoses:   Chemosis of conjunctiva, right     34-year-old female presents to the emergency department with concern over sudden onset ecchymosis of her right eye which occurred within the last 2 hours prior to arrival.  She had stable vital signs upon arrival, visual acuity was 20/25 in the right eye, 20/15 in the left both were corrected with her glasses.  Physical exam findings discussed and above were significant for mild right eye conjunctival injection, diffuse chemosis.  There is no fluorescein uptake to suggest corneal abrasion, no foreign body identified.  She did have elevated intraocular pressure at 23 which is similar to her baseline per her report.  Symptoms are consistent with chemosis of right conjunctivitis, likely allergic.  She was discharged home stable with instructions for symptomatic treatment with cool compresses, OTC antihistamine, prescription for Patanol eyedrops given.  Follow-up with neurologist as previously  scheduled and with ophthalmology if no improvement of eye symptoms within the next 24 to 48 hours.  Worrisome eye symptoms that would prompt sooner evaluation given.    Disclaimer: This note consists of symbols derived from keyboarding, dictation, and/or voice recognition software. As a result, there may be errors in the script that have gone undetected.  Please consider this when interpreting information found in the chart.    6/10/2020   [facility] EMERGENCY DEPARTMENT     Ashley Simons PA-C  06/10/20 [id number]

## 2021-05-26 ENCOUNTER — RECORDS - HEALTHEAST (OUTPATIENT)
Dept: ADMINISTRATIVE | Facility: CLINIC | Age: 36
End: 2021-05-26

## 2021-05-28 ENCOUNTER — RECORDS - HEALTHEAST (OUTPATIENT)
Dept: ADMINISTRATIVE | Facility: CLINIC | Age: 36
End: 2021-05-28

## 2021-05-29 ENCOUNTER — RECORDS - HEALTHEAST (OUTPATIENT)
Dept: ADMINISTRATIVE | Facility: CLINIC | Age: 36
End: 2021-05-29

## 2021-05-31 ENCOUNTER — RECORDS - HEALTHEAST (OUTPATIENT)
Dept: ADMINISTRATIVE | Facility: CLINIC | Age: 36
End: 2021-05-31

## 2021-06-03 ENCOUNTER — RECORDS - HEALTHEAST (OUTPATIENT)
Dept: ADMINISTRATIVE | Facility: CLINIC | Age: 36
End: 2021-06-03

## 2021-07-18 ENCOUNTER — HOSPITAL ENCOUNTER (EMERGENCY)
Facility: CLINIC | Age: 36
Discharge: HOME | End: 2021-07-19
Attending: EMERGENCY MEDICINE | Admitting: EMERGENCY MEDICINE
Payer: COMMERCIAL

## 2021-07-18 ENCOUNTER — APPOINTMENT (OUTPATIENT)
Dept: GENERAL RADIOLOGY | Facility: CLINIC | Age: 36
End: 2021-07-18
Attending: EMERGENCY MEDICINE
Payer: COMMERCIAL

## 2021-07-18 DIAGNOSIS — J45.21 MILD INTERMITTENT ASTHMA WITH EXACERBATION: ICD-10-CM

## 2021-07-18 DIAGNOSIS — M54.12 CERVICAL RADICULOPATHY: ICD-10-CM

## 2021-07-18 DIAGNOSIS — J06.9 UPPER RESPIRATORY TRACT INFECTION, UNSPECIFIED TYPE: ICD-10-CM

## 2021-07-18 PROCEDURE — 94640 AIRWAY INHALATION TREATMENT: CPT | Performed by: EMERGENCY MEDICINE

## 2021-07-18 PROCEDURE — 99285 EMERGENCY DEPT VISIT HI MDM: CPT | Mod: 25 | Performed by: EMERGENCY MEDICINE

## 2021-07-18 PROCEDURE — 93005 ELECTROCARDIOGRAM TRACING: CPT | Performed by: EMERGENCY MEDICINE

## 2021-07-18 PROCEDURE — 87635 SARS-COV-2 COVID-19 AMP PRB: CPT | Performed by: EMERGENCY MEDICINE

## 2021-07-18 PROCEDURE — 93010 ELECTROCARDIOGRAM REPORT: CPT | Performed by: EMERGENCY MEDICINE

## 2021-07-18 PROCEDURE — 71045 X-RAY EXAM CHEST 1 VIEW: CPT

## 2021-07-18 PROCEDURE — 999N000105 HC STATISTIC NO DOCUMENTATION TO SUPPORT CHARGE

## 2021-07-18 PROCEDURE — C9803 HOPD COVID-19 SPEC COLLECT: HCPCS | Performed by: EMERGENCY MEDICINE

## 2021-07-18 PROCEDURE — 250N000013 HC RX MED GY IP 250 OP 250 PS 637: Performed by: EMERGENCY MEDICINE

## 2021-07-18 RX ORDER — ALBUTEROL SULFATE 90 UG/1
6 AEROSOL, METERED RESPIRATORY (INHALATION) EVERY 6 HOURS PRN
Status: DISCONTINUED | OUTPATIENT
Start: 2021-07-18 | End: 2021-07-19 | Stop reason: HOSPADM

## 2021-07-18 RX ADMIN — ALBUTEROL SULFATE 6 PUFF: 90 AEROSOL, METERED RESPIRATORY (INHALATION) at 23:50

## 2021-07-18 ASSESSMENT — MIFFLIN-ST. JEOR: SCORE: 1394.42

## 2021-07-19 VITALS
DIASTOLIC BLOOD PRESSURE: 91 MMHG | BODY MASS INDEX: 31.72 KG/M2 | HEIGHT: 61 IN | SYSTOLIC BLOOD PRESSURE: 123 MMHG | TEMPERATURE: 97.1 F | RESPIRATION RATE: 16 BRPM | WEIGHT: 168 LBS | OXYGEN SATURATION: 99 % | HEART RATE: 84 BPM

## 2021-07-19 LAB
ANION GAP SERPL CALCULATED.3IONS-SCNC: 6 MMOL/L (ref 3–14)
BASOPHILS # BLD AUTO: 0 10E3/UL (ref 0–0.2)
BASOPHILS NFR BLD AUTO: 0 %
BUN SERPL-MCNC: 9 MG/DL (ref 7–30)
CALCIUM SERPL-MCNC: 9 MG/DL (ref 8.5–10.1)
CHLORIDE BLD-SCNC: 106 MMOL/L (ref 94–109)
CO2 SERPL-SCNC: 26 MMOL/L (ref 20–32)
CREAT SERPL-MCNC: 0.71 MG/DL (ref 0.52–1.04)
EOSINOPHIL # BLD AUTO: 0.5 10E3/UL (ref 0–0.7)
EOSINOPHIL NFR BLD AUTO: 6 %
ERYTHROCYTE [DISTWIDTH] IN BLOOD BY AUTOMATED COUNT: 12.5 % (ref 10–15)
GFR SERPL CREATININE-BSD FRML MDRD: >90 ML/MIN/1.73M2
GLUCOSE BLD-MCNC: 89 MG/DL (ref 70–99)
HCT VFR BLD AUTO: 40.3 % (ref 35–47)
HGB BLD-MCNC: 13.6 G/DL (ref 11.7–15.7)
IMM GRANULOCYTES # BLD: 0 10E3/UL
IMM GRANULOCYTES NFR BLD: 0 %
LYMPHOCYTES # BLD AUTO: 2 10E3/UL (ref 0.8–5.3)
LYMPHOCYTES NFR BLD AUTO: 28 %
MCH RBC QN AUTO: 29.6 PG (ref 26.5–33)
MCHC RBC AUTO-ENTMCNC: 33.7 G/DL (ref 31.5–36.5)
MCV RBC AUTO: 88 FL (ref 78–100)
MONOCYTES # BLD AUTO: 0.5 10E3/UL (ref 0–1.3)
MONOCYTES NFR BLD AUTO: 7 %
NEUTROPHILS # BLD AUTO: 4.2 10E3/UL (ref 1.6–8.3)
NEUTROPHILS NFR BLD AUTO: 59 %
NRBC # BLD AUTO: 0 10E3/UL
NRBC BLD AUTO-RTO: 0 /100
PLATELET # BLD AUTO: 351 10E3/UL (ref 150–450)
POTASSIUM BLD-SCNC: 4 MMOL/L (ref 3.4–5.3)
RBC # BLD AUTO: 4.6 10E6/UL (ref 3.8–5.2)
SARS-COV-2 RNA RESP QL NAA+PROBE: NEGATIVE
SODIUM SERPL-SCNC: 138 MMOL/L (ref 133–144)
TROPONIN I SERPL-MCNC: <0.015 UG/L (ref 0–0.04)
WBC # BLD AUTO: 7.2 10E3/UL (ref 4–11)

## 2021-07-19 PROCEDURE — 250N000012 HC RX MED GY IP 250 OP 636 PS 637: Performed by: EMERGENCY MEDICINE

## 2021-07-19 PROCEDURE — 85025 COMPLETE CBC W/AUTO DIFF WBC: CPT | Performed by: EMERGENCY MEDICINE

## 2021-07-19 PROCEDURE — 36415 COLL VENOUS BLD VENIPUNCTURE: CPT | Performed by: EMERGENCY MEDICINE

## 2021-07-19 PROCEDURE — 36592 COLLECT BLOOD FROM PICC: CPT | Performed by: EMERGENCY MEDICINE

## 2021-07-19 PROCEDURE — 80048 BASIC METABOLIC PNL TOTAL CA: CPT | Performed by: EMERGENCY MEDICINE

## 2021-07-19 PROCEDURE — 84484 ASSAY OF TROPONIN QUANT: CPT | Performed by: EMERGENCY MEDICINE

## 2021-07-19 RX ORDER — PREDNISONE 20 MG/1
TABLET ORAL
Qty: 3 TABLET | Refills: 0 | Status: SHIPPED | OUTPATIENT
Start: 2021-07-19

## 2021-07-19 RX ORDER — PREDNISONE 20 MG/1
40 TABLET ORAL ONCE
Status: COMPLETED | OUTPATIENT
Start: 2021-07-19 | End: 2021-07-19

## 2021-07-19 RX ADMIN — PREDNISONE 40 MG: 20 TABLET ORAL at 01:31

## 2021-07-19 NOTE — DISCHARGE INSTRUCTIONS
Return if symptoms worsen or new symptoms develop.  Follow-up with primary care physician next available.  Drink plenty of fluids.  If worsening congestion cough shortness of breath or other symptoms present please return for recheck.  Take prednisone as directed.

## 2021-07-19 NOTE — ED PROVIDER NOTES
History     Chief Complaint   Patient presents with     Shortness of Breath     Pt states 5 days ago a head cold developed. Runny nose, congestion, sore throat. Pt states symptoms moved to her chest and pt has shortness of breath. Pt has a history of asthma. Pt developed left arm pain a few hours ago. Pt has been taking advil and using her rescue inhaler for her symptoms. Pt has a cough and has been coughing up phlem     HPI  Melina Caicedo is a 36 year old female with past medical history significant for anxiety depression and asthma who presents the emergency department complaining of congestion for the past 5 days with increasing shortness of breath.  Patient states symptoms began about 5 days ago was a mild nasal congestion and has become increasing any congested in nature followed by mild sore throat and then having some congestion in her chest she has developed a mild cough with clear sputum.  Today she is felt like she is having some asthmatic issues and has been using her inhaler.  She developed some left arm pain this morning that is worsened today it radiates from her shoulder down into her fingertips mostly in the fourth and fifth digits.  She does not recall any trauma.  Says shooting pain.  There is no numbness or weakness present.  She denies any headache or visual changes.  She has not had had any chest pain.  She just feels like she cannot take a complete deep breath at this time.  She denies any nausea vomiting diarrhea or abdominal pain.  She has not had any focal numbness weakness in extremity.  She denies any bowel or bladder dysfunction.  She has not had a rash.  Denies any fevers or chills.  Patient has not had a Covid vaccine.    Allergies:  Allergies   Allergen Reactions     Citalopram Other (See Comments)     Seizure     Depakote Other (See Comments)     Headache       Fluticasone Other (See Comments)     Nosebleed and headache     Serotonin Reuptake Inhibitors Other (See Comments)     " seizure     Valproic Acid Other (See Comments)     Headache       Problem List:    Patient Active Problem List    Diagnosis Date Noted     Major depressive disorder 10/21/2016     Priority: Medium     Overview:   Major depression especially post partum.       Hematuria 10/10/2013     Priority: Medium     Heart murmur 09/12/2013     Priority: Medium     Overview:   Auscultated on 9/12/13.  No history of murmur according to patient and chart.       Persistent asthma 11/09/2012     Priority: Medium     Anxiety 02/01/2007     Priority: Medium        Past Medical History:    Past Medical History:   Diagnosis Date     Anxiety      Asthma      Bipolar affective (H)      Depressive disorder        Past Surgical History:    Past Surgical History:   Procedure Laterality Date     CHOLECYSTECTOMY  6/2011     CHOLECYSTECTOMY       ENDOSCOPY UPPER WITH PANCREATIC STIMULATION       TUBAL LIGATION Bilateral        Family History:    No family history on file.    Social History:  Marital Status:  Single [1]  Social History     Tobacco Use     Smoking status: Never Smoker     Smokeless tobacco: Never Used   Substance Use Topics     Alcohol use: No     Drug use: No        Medications:    predniSONE (DELTASONE) 20 MG tablet  albuterol (PROAIR HFA/PROVENTIL HFA/VENTOLIN HFA) 108 (90 Base) MCG/ACT inhaler  fluticasone-salmeterol (AIRDUO RESPICLICK 232/14) 232-14 MCG/ACT inhaler  magnesium 250 MG tablet  Melatonin 5 MG CHEW  nicotine polacrilex (COMMIT) 2 MG lozenge  prochlorperazine (COMPAZINE) 10 MG tablet  Riboflavin (VITAMIN B-2 PO)  venlafaxine (EFFEXOR-XR) 37.5 MG 24 hr capsule  vitamin D2 (ERGOCALCIFEROL) 49889 units (1250 mcg) capsule          Review of Systems  All systems reviewed and other than pertinent positives negatives in HPI all other systems are negative.  Physical Exam   BP: (!) 145/95  Pulse: 90  Temp: 97.1  F (36.2  C)  Resp: 16  Height: 154.9 cm (5' 1\")  Weight: 76.2 kg (168 lb)  SpO2: 98 %      Physical " Exam  Vitals and nursing note reviewed.   Constitutional:       General: She is not in acute distress.     Appearance: She is well-developed. She is not ill-appearing, toxic-appearing or diaphoretic.   HENT:      Head: Normocephalic and atraumatic.      Nose:      Comments: Mild nasal congestion is noted.     Mouth/Throat:      Mouth: Mucous membranes are moist.      Pharynx: Oropharynx is clear. No pharyngeal swelling or oropharyngeal exudate.   Eyes:      Conjunctiva/sclera: Conjunctivae normal.   Cardiovascular:      Rate and Rhythm: Normal rate and regular rhythm.      Pulses: Normal pulses.      Heart sounds: Normal heart sounds. No murmur heard.     Pulmonary:      Effort: Pulmonary effort is normal.      Breath sounds: No rhonchi or rales.      Comments: Breath sounds are decreased at bases.  Faint expiratory wheezes in upper lung fields  Chest:      Chest wall: No tenderness.   Abdominal:      General: Abdomen is flat. Bowel sounds are normal.      Palpations: Abdomen is soft.      Tenderness: There is no abdominal tenderness. There is no right CVA tenderness or left CVA tenderness.   Musculoskeletal:         General: No swelling or tenderness. Normal range of motion.      Cervical back: Normal range of motion and neck supple.      Right lower leg: No edema.      Left lower leg: No edema.   Skin:     General: Skin is warm and dry.      Findings: No rash.   Neurological:      General: No focal deficit present.      Mental Status: She is alert and oriented to person, place, and time.      Motor: No weakness.      Coordination: Coordination normal.   Psychiatric:         Mood and Affect: Mood normal.         ED Course        Procedures              EKG Interpretation:      Interpreted by Dave Loyd MD  Rhythm: normal sinus   Rate: normal  Axis: normal  Ectopy: none  Conduction: normal  ST Segments/ T Waves: No ST-T wave changes  Q Waves: none  Comparison to prior: Unchanged from 7/3/19    Clinical  Impression: normal EKG    Critical Care time:  none               Results for orders placed or performed during the hospital encounter of 07/18/21 (from the past 24 hour(s))   Symptomatic COVID-19 Virus (Coronavirus) by PCR Nasopharyngeal    Specimen: Nasopharyngeal; Swab    Narrative    The following orders were created for panel order Symptomatic COVID-19 Virus (Coronavirus) by PCR Nasopharyngeal.  Procedure                               Abnormality         Status                     ---------                               -----------         ------                     SARS-COV2 (COVID-19) Vir...[[id number]]  Normal              Final result                 Please view results for these tests on the individual orders.   SARS-COV2 (COVID-19) Virus RT-PCR    Specimen: Nasopharyngeal; Swab   Result Value Ref Range    SARS CoV2 PCR Negative Negative    Narrative    Testing was performed using the barbra  SARS-CoV-2 & Influenza A/B Assay on the barbra  Dahlia  System.  This test should be ordered for the detection of SARS-COV-2 in individuals who meet SARS-CoV-2 clinical and/or epidemiological criteria. Test performance is unknown in asymptomatic patients.  This test is for in vitro diagnostic use under the FDA EUA for laboratories certified under CLIA to perform moderate and/or high complexity testing. This test has not been FDA cleared or approved.  A negative test does not rule out the presence of PCR inhibitors in the specimen or target RNA in concentration below the limit of detection for the assay. The possibility of a false negative should be considered if the patient's recent exposure or clinical presentation suggests COVID-19.  [facility] are certified under the Clinical Laboratory Improvement Amendments of 1988 (CLIA-88) as qualified to perform moderate and/or high complexity laboratory testing.   XR Chest Port 1 View    Narrative    EXAM: XR CHEST PORT 1 VIEW  LOCATION: [facility]  Services  DATE/TIME: 7/18/2021 11:54 PM    INDICATION: sob  COMPARISON: 12/30/2019.      Impression    IMPRESSION: Negative chest.   CBC with platelets differential    Narrative    The following orders were created for panel order CBC with platelets differential.  Procedure                               Abnormality         Status                     ---------                               -----------         ------                     CBC with platelets and d...[[id number]]                      Final result                 Please view results for these tests on the individual orders.   Basic metabolic panel   Result Value Ref Range    Sodium 138 133 - 144 mmol/L    Potassium 4.0 3.4 - 5.3 mmol/L    Chloride 106 94 - 109 mmol/L    Carbon Dioxide (CO2) 26 20 - 32 mmol/L    Anion Gap 6 3 - 14 mmol/L    Urea Nitrogen 9 7 - 30 mg/dL    Creatinine 0.71 0.52 - 1.04 mg/dL    Calcium 9.0 8.5 - 10.1 mg/dL    Glucose 89 70 - 99 mg/dL    GFR Estimate >90 >60 mL/min/1.73m2   Troponin I   Result Value Ref Range    Troponin I <0.015 0.000 - 0.045 ug/L   CBC with platelets and differential   Result Value Ref Range    WBC Count 7.2 4.0 - 11.0 10e3/uL    RBC Count 4.60 3.80 - 5.20 10e6/uL    Hemoglobin 13.6 11.7 - 15.7 g/dL    Hematocrit 40.3 35.0 - 47.0 %    MCV 88 78 - 100 fL    MCH 29.6 26.5 - 33.0 pg    MCHC 33.7 31.5 - 36.5 g/dL    RDW 12.5 10.0 - 15.0 %    Platelet Count 351 150 - 450 10e3/uL    % Neutrophils 59 %    % Lymphocytes 28 %    % Monocytes 7 %    % Eosinophils 6 %    % Basophils 0 %    % Immature Granulocytes 0 %    NRBCs per 100 WBC 0 <1 /100    Absolute Neutrophils 4.2 1.6 - 8.3 10e3/uL    Absolute Lymphocytes 2.0 0.8 - 5.3 10e3/uL    Absolute Monocytes 0.5 0.0 - 1.3 10e3/uL    Absolute Eosinophils 0.5 0.0 - 0.7 10e3/uL    Absolute Basophils 0.0 0.0 - 0.2 10e3/uL    Absolute Immature Granulocytes 0.0 <=0.0 10e3/uL    Absolute NRBCs 0.0 10e3/uL       Medications   predniSONE (DELTASONE) tablet 40 mg (40 mg Oral Given  7/19/21 0131)     Results for orders placed or performed during the hospital encounter of 07/18/21   XR Chest Port 1 View    Narrative    EXAM: XR CHEST PORT 1 VIEW  LOCATION: [facility]  DATE/TIME: 7/18/2021 11:54 PM    INDICATION: sob  COMPARISON: 12/30/2019.      Impression    IMPRESSION: Negative chest.       Assessments & Plan (with Medical Decision Making) records were reviewed.  EKG was obtained and was unchanged and unremarkable.  Patient was given a inhaler.  Covid test was obtained.  CBC was unremarkable.  Basic metabolic panel without significant abnormality.  Chest x-ray was obtained.  Chest x-ray was unremarkable.  On reexamination no more wheezing is heard and her breath sounds were improved.  I considered a PE but patient is not tachycardic or tachypnei and sats are 98 to 99% on room air.  This appears to be a mild asthma exacerbation.  I also feel her arm pain is probably secondary to cervical radiculopathy.  She should take ibuprofen and Tylenol for this and follow-up with primary care physician or ED if symptoms worsen.  I Nataliia give patient a short course of prednisone and have advised her to return if symptoms worsen or new symptoms develop.  She is in agreement this plan.     I have reviewed the nursing notes.    I have reviewed the findings, diagnosis, plan and need for follow up with the patient.       Discharge Medication List as of 7/19/2021  1:34 AM          Final diagnoses:   Upper respiratory tract infection, unspecified type   Mild intermittent asthma with exacerbation   Cervical radiculopathy       7/18/2021   [facility] EMERGENCY DEPT     Dave Loyd MD  07/19/21 9828

## 2021-09-02 ENCOUNTER — APPOINTMENT (OUTPATIENT)
Dept: FAMILY MEDICINE | Facility: CLINIC | Age: 36
End: 2021-09-02

## 2021-09-02 ENCOUNTER — APPOINTMENT (OUTPATIENT)
Dept: OCCUPATIONAL MEDICINE | Facility: CLINIC | Age: 36
End: 2021-09-02

## 2021-09-02 PROCEDURE — 99499 UNLISTED E&M SERVICE: CPT | Performed by: FAMILY MEDICINE

## 2021-09-02 PROCEDURE — 97799 UNLISTED PHYSCL MED/REHAB PX: CPT | Performed by: FAMILY MEDICINE

## 2022-09-04 ENCOUNTER — HOSPITAL ENCOUNTER (EMERGENCY)
Facility: HOSPITAL | Age: 37
Discharge: HOME | End: 2022-09-04
Attending: EMERGENCY MEDICINE | Admitting: EMERGENCY MEDICINE
Payer: COMMERCIAL

## 2022-09-04 VITALS
BODY MASS INDEX: 31.65 KG/M2 | DIASTOLIC BLOOD PRESSURE: 76 MMHG | HEIGHT: 62 IN | HEART RATE: 98 BPM | TEMPERATURE: 97.8 F | WEIGHT: 172 LBS | SYSTOLIC BLOOD PRESSURE: 129 MMHG | OXYGEN SATURATION: 99 % | RESPIRATION RATE: 18 BRPM

## 2022-09-04 DIAGNOSIS — R00.2 PALPITATIONS: ICD-10-CM

## 2022-09-04 LAB
ANION GAP SERPL CALCULATED.3IONS-SCNC: 12 MMOL/L (ref 5–18)
BASOPHILS # BLD AUTO: 0.1 10E3/UL (ref 0–0.2)
BASOPHILS NFR BLD AUTO: 1 %
BUN SERPL-MCNC: 13 MG/DL (ref 8–22)
CALCIUM SERPL-MCNC: 9 MG/DL (ref 8.5–10.5)
CHLORIDE BLD-SCNC: 107 MMOL/L (ref 98–107)
CO2 SERPL-SCNC: 23 MMOL/L (ref 22–31)
CREAT SERPL-MCNC: 0.96 MG/DL (ref 0.6–1.1)
EOSINOPHIL # BLD AUTO: 0.3 10E3/UL (ref 0–0.7)
EOSINOPHIL NFR BLD AUTO: 5 %
ERYTHROCYTE [DISTWIDTH] IN BLOOD BY AUTOMATED COUNT: 12.1 % (ref 10–15)
GFR SERPL CREATININE-BSD FRML MDRD: 78 ML/MIN/1.73M2
GLUCOSE BLD-MCNC: 95 MG/DL (ref 70–125)
HCT VFR BLD AUTO: 42.5 % (ref 35–47)
HGB BLD-MCNC: 14.6 G/DL (ref 11.7–15.7)
HOLD SPECIMEN: NORMAL
HOLD SPECIMEN: NORMAL
IMM GRANULOCYTES # BLD: 0 10E3/UL
IMM GRANULOCYTES NFR BLD: 0 %
LYMPHOCYTES # BLD AUTO: 2.2 10E3/UL (ref 0.8–5.3)
LYMPHOCYTES NFR BLD AUTO: 38 %
MAGNESIUM SERPL-MCNC: 2.3 MG/DL (ref 1.8–2.6)
MCH RBC QN AUTO: 30.3 PG (ref 26.5–33)
MCHC RBC AUTO-ENTMCNC: 34.4 G/DL (ref 31.5–36.5)
MCV RBC AUTO: 88 FL (ref 78–100)
MONOCYTES # BLD AUTO: 0.4 10E3/UL (ref 0–1.3)
MONOCYTES NFR BLD AUTO: 7 %
NEUTROPHILS # BLD AUTO: 3 10E3/UL (ref 1.6–8.3)
NEUTROPHILS NFR BLD AUTO: 49 %
NRBC # BLD AUTO: 0 10E3/UL
NRBC BLD AUTO-RTO: 0 /100
PLATELET # BLD AUTO: 418 10E3/UL (ref 150–450)
POTASSIUM BLD-SCNC: 4 MMOL/L (ref 3.5–5)
RBC # BLD AUTO: 4.82 10E6/UL (ref 3.8–5.2)
SODIUM SERPL-SCNC: 142 MMOL/L (ref 136–145)
TROPONIN I SERPL-MCNC: <0.01 NG/ML (ref 0–0.29)
TSH SERPL DL<=0.005 MIU/L-ACNC: 1.61 UIU/ML (ref 0.3–5)
WBC # BLD AUTO: 5.9 10E3/UL (ref 4–11)

## 2022-09-04 PROCEDURE — 80048 BASIC METABOLIC PNL TOTAL CA: CPT | Performed by: EMERGENCY MEDICINE

## 2022-09-04 PROCEDURE — 83735 ASSAY OF MAGNESIUM: CPT | Performed by: EMERGENCY MEDICINE

## 2022-09-04 PROCEDURE — 36415 COLL VENOUS BLD VENIPUNCTURE: CPT | Performed by: EMERGENCY MEDICINE

## 2022-09-04 PROCEDURE — 93005 ELECTROCARDIOGRAM TRACING: CPT | Performed by: STUDENT IN AN ORGANIZED HEALTH CARE EDUCATION/TRAINING PROGRAM

## 2022-09-04 PROCEDURE — 84484 ASSAY OF TROPONIN QUANT: CPT | Performed by: EMERGENCY MEDICINE

## 2022-09-04 PROCEDURE — 85025 COMPLETE CBC W/AUTO DIFF WBC: CPT | Performed by: EMERGENCY MEDICINE

## 2022-09-04 PROCEDURE — 99284 EMERGENCY DEPT VISIT MOD MDM: CPT

## 2022-09-04 PROCEDURE — 84443 ASSAY THYROID STIM HORMONE: CPT | Performed by: EMERGENCY MEDICINE

## 2022-09-04 ASSESSMENT — ACTIVITIES OF DAILY LIVING (ADL): ADLS_ACUITY_SCORE: 35

## 2022-09-05 ENCOUNTER — NURSE TRIAGE (OUTPATIENT)
Dept: NURSING | Facility: CLINIC | Age: 37
End: 2022-09-05

## 2022-09-05 LAB
ATRIAL RATE - MUSE: 88 BPM
DIASTOLIC BLOOD PRESSURE - MUSE: NORMAL MMHG
INTERPRETATION ECG - MUSE: NORMAL
P AXIS - MUSE: 60 DEGREES
PR INTERVAL - MUSE: 142 MS
QRS DURATION - MUSE: 84 MS
QT - MUSE: 362 MS
QTC - MUSE: 438 MS
R AXIS - MUSE: 50 DEGREES
SYSTOLIC BLOOD PRESSURE - MUSE: NORMAL MMHG
T AXIS - MUSE: 37 DEGREES
VENTRICULAR RATE- MUSE: 88 BPM

## 2022-09-05 NOTE — DISCHARGE INSTRUCTIONS
You were seen in the Emergency Department today for evaluation of palpitations.  Your lab work showed no cause of your symptoms.  Make sure you are drinking plenty of fluids.  Decrease caffeine intake and watch your alcohol intake.  Follow up with your primary care physician to ensure resolution of symptoms. Return if you have new or worsening symptoms.

## 2022-09-05 NOTE — ED TRIAGE NOTES
Patient has had palpitations in the past, has increased over the last two days. Frequent today.      Triage Assessment     Row Name 09/04/22 2009       Triage Assessment (Adult)    Airway WDL WDL       Respiratory WDL    Respiratory WDL WDL       Skin Circulation/Temperature WDL    Skin Circulation/Temperature WDL WDL       Cardiac WDL    Cardiac WDL WDL       Peripheral/Neurovascular WDL    Peripheral Neurovascular WDL WDL       Cognitive/Neuro/Behavioral WDL    Cognitive/Neuro/Behavioral WDL WDL       Addis Coma Scale    Best Eye Response 4-->(E4) spontaneous    Best Motor Response 6-->(M6) obeys commands    Best Verbal Response 5-->(V5) oriented    Covert Coma Scale Score 15

## 2022-09-05 NOTE — TELEPHONE ENCOUNTER
"S: Heart palpitations    B:W as in the ED last night for heart palpitations and \"fluttering\". Pt states that she was sent home and told everything was fine. Pt calling today stating that she continues to have palpitaions, that she feels short of breath and weak. States she is having some chest discomfort on the left side. Pulse at the time of call was 96.     A: Heart palpitations    R: It is recommended per protocol that patient return to the ED. Pt advised she wasn't sure if she would go back. She stated she didn't  Know what else they could do for her. Pt advised to go to the ED by writer.    DAYANNA AGUILERA RN      Reason for Disposition    Dizziness, lightheadedness, or weakness    Additional Information    Negative: Passed out (i.e., lost consciousness, collapsed and was not responding)    Negative: Shock suspected (e.g., cold/pale/clammy skin, too weak to stand, low BP, rapid pulse)    Negative: Difficult to awaken or acting confused (e.g., disoriented, slurred speech)    Negative: Visible sweat on face or sweat dripping down face    Negative: Unable to walk, or can only walk with assistance (e.g., requires support)    Negative: [1] Received SHOCK from implantable cardiac defibrillator AND [2] persisting symptoms (i.e., palpitations, lightheadedness)    Negative: [1] Dizziness, lightheadedness, or weakness AND [2] heart beating very rapidly (e.g., > 140 / minute)    Negative: [1] Dizziness, lightheadedness, or weakness AND [2] heart beating very slowly (e.g., < 50 / minute)    Negative: Sounds like a life-threatening emergency to the triager    Negative: Chest pain    Negative: Implantable Cardiac Defibrillator (ICD) or a pacemaker symptoms or questions    Negative: Difficulty breathing    Protocols used: HEART RATE AND HEARTBEAT [id number]      "

## 2022-09-05 NOTE — ED PROVIDER NOTES
EMERGENCY DEPARTMENT ENCOUNTER      NAME: Melina Caicedo  AGE: 37 year old female  YOB: 1985  MRN: [id number]  EVALUATION DATE & TIME: 9/4/2022  8:11 PM    PCP: Cheryl Hardy    ED PROVIDER: Shayla Tyler M.D.      Chief Complaint   Patient presents with     Chest Pain     Palpitations     FINAL IMPRESSION:  1. Palpitations      ED COURSE & MEDICAL DECISION MAKING:    Pertinent Labs & Imaging studies reviewed. (See chart for details)  ED Course as of 09/04/22 2226   Sun Sep 04, 2022   2057 Patient is a 37-year-old female comes in today for evaluation of palpitations.  She reports that they have happened before but in last few days has been increased frequency.  She is never been checked out before.  She thought since it was going on she should come in and get checked out.  We did an EKG on arrival which is unremarkable.  We will check blood work and see if we can find a cause for her palpitations.  I think everything looks okay we can refer her back to her primary care doctor and they can do further monitoring on her.  We will keep her on the monitor while she is here in the emergency department and see if we find any arrhythmias here.  She otherwise doing okay and comfortable with the plan.   2151 Everything checked out okay here.  I think we can set the patient up with a new primary care doctor as an outpatient.  I will discuss this with her.   2154 Discussed results and plan for discharge with the patient.  She is in agreement.  We will get her dismissed shortly.       At the conclusion of the encounter I discussed  the results of all of the tests and the disposition with patient.   All questions were answered.  The patient acknowledged understanding and was involved in the decision making regarding the overall care plan.      I discussed with patient the utility, limitations and findings of the exam/interventions/studies done during this visit as well as the list of differential  "diagnosis and symptoms to monitor/return to ER for.  Additional verbal discharge instructions were provided.     MEDICATIONS GIVEN IN THE EMERGENCY:  Medications - No data to display    NEW PRESCRIPTIONS STARTED AT TODAY'S ER VISIT  New Prescriptions    No medications on file      =================================================================    HPI    Triage Note:   Patient has had palpitations in the past, has increased over the last two days. Frequent today.      Triage Assessment     Row Name 09/04/22 2009       Triage Assessment (Adult)    Airway WDL WDL       Respiratory WDL    Respiratory WDL WDL       Skin Circulation/Temperature WDL    Skin Circulation/Temperature WDL WDL       Cardiac WDL    Cardiac WDL WDL       Peripheral/Neurovascular WDL    Peripheral Neurovascular WDL WDL       Cognitive/Neuro/Behavioral WDL    Cognitive/Neuro/Behavioral WDL WDL       Addis Coma Scale    Best Eye Response 4-->(E4) spontaneous    Best Motor Response 6-->(M6) obeys commands    Best Verbal Response 5-->(V5) oriented    Potter Coma Scale Score 15              Patient information was obtained from: the patient.    Use of : N/A      Melina Caicedo is a 37 year old female who presents with palpitations.    Patient reports experiencing \"fluttering\" episodes in her chest that last a couple of hours. She has experienced this before, but they would only last a couple of seconds. She endorses shortness of breath with palpitations.  She has never been evaluated for this before.  She uses some caffeine and occasionally some alcohol but nothing in excess that would obviously be causing this.    Patient denies chest pain and all other medical symptoms at this time.      REVIEW OF SYSTEMS   Except as stated in the HPI all other systems reviewed and are negative.    PAST MEDICAL HISTORY:  Past Medical History:   Diagnosis Date     Anxiety      Asthma      Bipolar affective (H)      Depressive disorder        PAST " SURGICAL HISTORY:  Past Surgical History:   Procedure Laterality Date     CHOLECYSTECTOMY  6/2011     CHOLECYSTECTOMY       ENDOSCOPY UPPER WITH PANCREATIC STIMULATION       TUBAL LIGATION Bilateral        CURRENT MEDICATIONS:    No current facility-administered medications for this encounter.    Current Outpatient Medications:      albuterol (PROAIR HFA/PROVENTIL HFA/VENTOLIN HFA) 108 (90 Base) MCG/ACT inhaler, Inhale 1-2 puffs into the lungs every 4 hours as needed for shortness of breath / dyspnea or wheezing, Disp: 6.7 g, Rfl: 0     fluticasone-salmeterol (AIRDUO RESPICLICK 232/14) 232-14 MCG/ACT inhaler, Inhale 1 puff into the lungs 2 times daily, Disp: , Rfl:      magnesium 250 MG tablet, Take 500 mg by mouth At Bedtime , Disp: , Rfl:      Melatonin 5 MG CHEW, Take 5 mg by mouth nightly as needed, Disp: , Rfl:      nicotine polacrilex (COMMIT) 2 MG lozenge, Place 2 mg inside cheek every 4 hours as needed , Disp: , Rfl:      predniSONE (DELTASONE) 20 MG tablet, Take one tablet(= 20mg) each day for 3 (three) days, Disp: 3 tablet, Rfl: 0     prochlorperazine (COMPAZINE) 10 MG tablet, Take 5-10 mg by mouth every 6 hours as needed for nausea or vomiting, Disp: , Rfl:      Riboflavin (VITAMIN B-2 PO), Take 500 mg by mouth daily , Disp: , Rfl:      venlafaxine (EFFEXOR-XR) 37.5 MG 24 hr capsule, Take 37.5 mg by mouth daily, Disp: , Rfl:      vitamin D2 (ERGOCALCIFEROL) 16390 units (1250 mcg) capsule, Take 50,000 Units by mouth once a week, Disp: , Rfl:     ALLERGIES:  Allergies   Allergen Reactions     Citalopram Other (See Comments)     Seizure     Depakote Other (See Comments)     Headache       Fluticasone Other (See Comments)     Nosebleed and headache     Serotonin Reuptake Inhibitors Other (See Comments)     seizure     Valproic Acid Other (See Comments)     Headache       FAMILY HISTORY:  No family history on file.    SOCIAL HISTORY:   Social History     Socioeconomic History     Marital status: Single  "  Tobacco Use     Smoking status: Never Smoker     Smokeless tobacco: Never Used   Substance and Sexual Activity     Alcohol use: No     Drug use: No     Sexual activity: Yes     Partners: Male       PHYSICAL EXAM    VITAL SIGNS: /76   Pulse 98   Temp 97.8  F (36.6  C) (Temporal)   Resp 18   Ht 1.575 m (5' 2\")   Wt 78 kg (172 lb)   LMP 06/10/2016   SpO2 99%   BMI 31.46 kg/m     GENERAL: Awake, Alert, answering questions, No acute distress, Well nourished  HEENT: Normal cephalic, Atraumatic, bilateral external ears normal, No scleral icterus, mask in place  NECK: No obvious swelling or abnormality, No stridor  PULMONARY:Normal and symmetric breath sounds, No respiratory distress, Lungs clear to auscultation bilaterally. No wheezing  CARDIOVASCULAR: Regular rate and rhythm, Distal pulses present and normal.  ABDOMINAL: Soft, Nondistended, Nontender, No flank tenderness, No palpable masses  BACK: No tenderness.  EXTREMITIES: Moves all extremities spontaneously, warm, no edema, No major deformities  NEURO: No facial droop, normal motor function, Normal speech   PSYCH: Normal mood and affect  SKIN: No rashes on visualized skin, dry, warm     LAB:  All pertinent labs reviewed and interpreted.  Results for orders placed or performed during the hospital encounter of 09/04/22   Basic metabolic panel   Result Value Ref Range    Sodium 142 136 - 145 mmol/L    Potassium 4.0 3.5 - 5.0 mmol/L    Chloride 107 98 - 107 mmol/L    Carbon Dioxide (CO2) 23 22 - 31 mmol/L    Anion Gap 12 5 - 18 mmol/L    Urea Nitrogen 13 8 - 22 mg/dL    Creatinine 0.96 0.60 - 1.10 mg/dL    Calcium 9.0 8.5 - 10.5 mg/dL    Glucose 95 70 - 125 mg/dL    GFR Estimate 78 >60 mL/min/1.73m2   Result Value Ref Range    Magnesium 2.3 1.8 - 2.6 mg/dL   Result Value Ref Range    Troponin I <0.01 0.00 - 0.29 ng/mL   TSH with free T4 reflex   Result Value Ref Range    TSH 1.61 0.30 - 5.00 uIU/mL   CBC with platelets and differential   Result Value Ref " Range    WBC Count 5.9 4.0 - 11.0 10e3/uL    RBC Count 4.82 3.80 - 5.20 10e6/uL    Hemoglobin 14.6 11.7 - 15.7 g/dL    Hematocrit 42.5 35.0 - 47.0 %    MCV 88 78 - 100 fL    MCH 30.3 26.5 - 33.0 pg    MCHC 34.4 31.5 - 36.5 g/dL    RDW 12.1 10.0 - 15.0 %    Platelet Count 418 150 - 450 10e3/uL    % Neutrophils 49 %    % Lymphocytes 38 %    % Monocytes 7 %    % Eosinophils 5 %    % Basophils 1 %    % Immature Granulocytes 0 %    NRBCs per 100 WBC 0 <1 /100    Absolute Neutrophils 3.0 1.6 - 8.3 10e3/uL    Absolute Lymphocytes 2.2 0.8 - 5.3 10e3/uL    Absolute Monocytes 0.4 0.0 - 1.3 10e3/uL    Absolute Eosinophils 0.3 0.0 - 0.7 10e3/uL    Absolute Basophils 0.1 0.0 - 0.2 10e3/uL    Absolute Immature Granulocytes 0.0 <=0.4 10e3/uL    Absolute NRBCs 0.0 10e3/uL   Extra Blue Top Tube   Result Value Ref Range    Hold Specimen JIC    Extra Purple Top Tube   Result Value Ref Range    Hold Specimen JIC      EKG:    Date and time: September 4, 2022 at 2018  Rate: 88 bpm  Rhythm: Normal sinus rhythm  MT interval: 142 ms  QRS interval: 84 ms  QT/QTc: 362/438 ms  ST changes or T wave changes: No acute ST or T wave abnormalities  Change from prior ECG: No significant change from prior.  I have independently reviewed and interpreted this EKG.     I, Lucila Yao, am serving as a scribe to document services personally performed by Dr. Tyler based on my observation and the provider's statements to me. I, Shayla Tyler MD attest that Lucila Yao is acting in a scribe capacity, has observed my performance of the services and has documented them in accordance with my direction.    Shayla Tyler M.D.  Emergency Medicine  [facility] EMERGENCY DEPARTMENT  [facility] [facility] [id number] [phone]  Dept: [phone]     Shayla Tyler MD  09/04/22 8186